# Patient Record
Sex: FEMALE | Race: WHITE | NOT HISPANIC OR LATINO | URBAN - METROPOLITAN AREA
[De-identification: names, ages, dates, MRNs, and addresses within clinical notes are randomized per-mention and may not be internally consistent; named-entity substitution may affect disease eponyms.]

---

## 2024-05-08 ENCOUNTER — TELEPHONE ENCOUNTER (OUTPATIENT)
Dept: URBAN - METROPOLITAN AREA CLINIC 63 | Facility: CLINIC | Age: 79
End: 2024-05-08

## 2024-05-13 ENCOUNTER — OFFICE VISIT (OUTPATIENT)
Dept: URBAN - METROPOLITAN AREA CLINIC 60 | Facility: CLINIC | Age: 79
End: 2024-05-13
Payer: MEDICARE

## 2024-05-13 ENCOUNTER — DASHBOARD ENCOUNTERS (OUTPATIENT)
Age: 79
End: 2024-05-13

## 2024-05-13 VITALS
OXYGEN SATURATION: 96 % | TEMPERATURE: 97.8 F | HEIGHT: 60 IN | SYSTOLIC BLOOD PRESSURE: 116 MMHG | WEIGHT: 132 LBS | DIASTOLIC BLOOD PRESSURE: 68 MMHG | BODY MASS INDEX: 25.91 KG/M2 | HEART RATE: 59 BPM

## 2024-05-13 DIAGNOSIS — Z87.19 HISTORY OF DIVERTICULITIS: ICD-10-CM

## 2024-05-13 DIAGNOSIS — Z12.11 COLON CANCER SCREENING: ICD-10-CM

## 2024-05-13 DIAGNOSIS — R43.8 METALLIC TASTE: ICD-10-CM

## 2024-05-13 PROBLEM — 11193009: Status: ACTIVE | Noted: 2024-05-13

## 2024-05-13 PROCEDURE — 99204 OFFICE O/P NEW MOD 45 MIN: CPT

## 2024-05-13 RX ORDER — CIPROFLOXACIN HYDROCHLORIDE 500 MG/1
TABLET, FILM COATED ORAL
Qty: 20 TABLET | Status: ACTIVE | COMMUNITY

## 2024-05-13 RX ORDER — ONDANSETRON HYDROCHLORIDE 4 MG/1
1 TABLET TABLET, FILM COATED ORAL
Qty: 2 | Refills: 0 | OUTPATIENT
Start: 2024-05-13

## 2024-05-13 RX ORDER — METOPROLOL TARTRATE 25 MG/1
TABLET, FILM COATED ORAL
Qty: 45 TABLET | Status: ACTIVE | COMMUNITY

## 2024-05-13 RX ORDER — APIXABAN 5 MG/1
TABLET, FILM COATED ORAL
Qty: 120 EACH | Refills: 0 | Status: ACTIVE | COMMUNITY

## 2024-05-13 RX ORDER — OMEGA-3 FATTY ACIDS/FISH OIL 300-1000MG
1 CAPSULE CAPSULE ORAL THREE TIMES A DAY
Qty: 90 | Status: ACTIVE | COMMUNITY
Start: 2024-05-13 | End: 2024-06-12

## 2024-05-13 RX ORDER — ALENDRONATE SODIUM 70 MG/1
TABLET ORAL
Qty: 12 EACH | Refills: 1 | Status: ACTIVE | COMMUNITY

## 2024-05-13 RX ORDER — EZETIMIBE 10 MG/1
TABLET ORAL
Qty: 90 TABLET | Status: ACTIVE | COMMUNITY

## 2024-05-13 RX ORDER — CHOLECALCIFEROL (VITAMIN D3) 50 MCG
1 CAPSULE CAPSULE ORAL ONCE A DAY
Qty: 30 | Status: ACTIVE | COMMUNITY
Start: 2024-05-13 | End: 2024-06-12

## 2024-05-13 RX ORDER — METRONIDAZOLE 250 MG/1
TABLET, FILM COATED ORAL
Qty: 30 TABLET | Status: ACTIVE | COMMUNITY

## 2024-05-20 ENCOUNTER — LAB OUTSIDE AN ENCOUNTER (OUTPATIENT)
Dept: URBAN - METROPOLITAN AREA CLINIC 60 | Facility: CLINIC | Age: 79
End: 2024-05-20

## 2024-07-13 ENCOUNTER — APPOINTMENT (OUTPATIENT)
Dept: CARDIOLOGY | Facility: HOSPITAL | Age: 79
End: 2024-07-13
Payer: MEDICARE

## 2024-07-13 ENCOUNTER — HOSPITAL ENCOUNTER (EMERGENCY)
Facility: HOSPITAL | Age: 79
Discharge: ANOTHER HOSPITAL | End: 2024-07-14
Attending: STUDENT IN AN ORGANIZED HEALTH CARE EDUCATION/TRAINING PROGRAM
Payer: MEDICARE

## 2024-07-13 ENCOUNTER — APPOINTMENT (OUTPATIENT)
Dept: RADIOLOGY | Facility: HOSPITAL | Age: 79
End: 2024-07-13
Payer: MEDICARE

## 2024-07-13 DIAGNOSIS — K92.2 UPPER GI BLEED: Primary | ICD-10-CM

## 2024-07-13 LAB
ALBUMIN SERPL BCP-MCNC: 4 G/DL (ref 3.4–5)
ALP SERPL-CCNC: 40 U/L (ref 33–136)
ALT SERPL W P-5'-P-CCNC: 7 U/L (ref 7–45)
ANION GAP SERPL CALC-SCNC: 15 MMOL/L (ref 10–20)
APTT PPP: 35 SECONDS (ref 27–38)
AST SERPL W P-5'-P-CCNC: 12 U/L (ref 9–39)
BASOPHILS # BLD AUTO: 0.08 X10*3/UL (ref 0–0.1)
BASOPHILS NFR BLD AUTO: 0.7 %
BILIRUB SERPL-MCNC: 0.6 MG/DL (ref 0–1.2)
BNP SERPL-MCNC: 83 PG/ML (ref 0–99)
BUN SERPL-MCNC: 56 MG/DL (ref 6–23)
CALCIUM SERPL-MCNC: 9.3 MG/DL (ref 8.6–10.3)
CARDIAC TROPONIN I PNL SERPL HS: 5 NG/L (ref 0–13)
CHLORIDE SERPL-SCNC: 108 MMOL/L (ref 98–107)
CO2 SERPL-SCNC: 21 MMOL/L (ref 21–32)
CREAT SERPL-MCNC: 0.79 MG/DL (ref 0.5–1.05)
EGFRCR SERPLBLD CKD-EPI 2021: 77 ML/MIN/1.73M*2
EOSINOPHIL # BLD AUTO: 0.01 X10*3/UL (ref 0–0.4)
EOSINOPHIL NFR BLD AUTO: 0.1 %
ERYTHROCYTE [DISTWIDTH] IN BLOOD BY AUTOMATED COUNT: 12.9 % (ref 11.5–14.5)
GLUCOSE SERPL-MCNC: 180 MG/DL (ref 74–99)
HCT VFR BLD AUTO: 32.8 % (ref 36–46)
HGB BLD-MCNC: 11.2 G/DL (ref 12–16)
IMM GRANULOCYTES # BLD AUTO: 0.06 X10*3/UL (ref 0–0.5)
IMM GRANULOCYTES NFR BLD AUTO: 0.5 % (ref 0–0.9)
INR PPP: 1.3 (ref 0.9–1.1)
LIPASE SERPL-CCNC: 21 U/L (ref 9–82)
LYMPHOCYTES # BLD AUTO: 1.32 X10*3/UL (ref 0.8–3)
LYMPHOCYTES NFR BLD AUTO: 11.4 %
MAGNESIUM SERPL-MCNC: 1.7 MG/DL (ref 1.6–2.4)
MCH RBC QN AUTO: 32.7 PG (ref 26–34)
MCHC RBC AUTO-ENTMCNC: 34.1 G/DL (ref 32–36)
MCV RBC AUTO: 96 FL (ref 80–100)
MONOCYTES # BLD AUTO: 0.43 X10*3/UL (ref 0.05–0.8)
MONOCYTES NFR BLD AUTO: 3.7 %
NEUTROPHILS # BLD AUTO: 9.63 X10*3/UL (ref 1.6–5.5)
NEUTROPHILS NFR BLD AUTO: 83.6 %
NRBC BLD-RTO: 0 /100 WBCS (ref 0–0)
PLATELET # BLD AUTO: 260 X10*3/UL (ref 150–450)
POTASSIUM SERPL-SCNC: 4.1 MMOL/L (ref 3.5–5.3)
PROT SERPL-MCNC: 6.1 G/DL (ref 6.4–8.2)
PROTHROMBIN TIME: 14.5 SECONDS (ref 9.8–12.8)
RBC # BLD AUTO: 3.43 X10*6/UL (ref 4–5.2)
SODIUM SERPL-SCNC: 140 MMOL/L (ref 136–145)
WBC # BLD AUTO: 11.5 X10*3/UL (ref 4.4–11.3)

## 2024-07-13 PROCEDURE — 86920 COMPATIBILITY TEST SPIN: CPT

## 2024-07-13 PROCEDURE — 96374 THER/PROPH/DIAG INJ IV PUSH: CPT | Mod: 59

## 2024-07-13 PROCEDURE — 85610 PROTHROMBIN TIME: CPT | Performed by: STUDENT IN AN ORGANIZED HEALTH CARE EDUCATION/TRAINING PROGRAM

## 2024-07-13 PROCEDURE — 84484 ASSAY OF TROPONIN QUANT: CPT | Performed by: STUDENT IN AN ORGANIZED HEALTH CARE EDUCATION/TRAINING PROGRAM

## 2024-07-13 PROCEDURE — 36415 COLL VENOUS BLD VENIPUNCTURE: CPT | Performed by: STUDENT IN AN ORGANIZED HEALTH CARE EDUCATION/TRAINING PROGRAM

## 2024-07-13 PROCEDURE — 85730 THROMBOPLASTIN TIME PARTIAL: CPT | Performed by: STUDENT IN AN ORGANIZED HEALTH CARE EDUCATION/TRAINING PROGRAM

## 2024-07-13 PROCEDURE — 86850 RBC ANTIBODY SCREEN: CPT | Performed by: STUDENT IN AN ORGANIZED HEALTH CARE EDUCATION/TRAINING PROGRAM

## 2024-07-13 PROCEDURE — 83880 ASSAY OF NATRIURETIC PEPTIDE: CPT | Performed by: STUDENT IN AN ORGANIZED HEALTH CARE EDUCATION/TRAINING PROGRAM

## 2024-07-13 PROCEDURE — 93005 ELECTROCARDIOGRAM TRACING: CPT

## 2024-07-13 PROCEDURE — 83690 ASSAY OF LIPASE: CPT | Performed by: STUDENT IN AN ORGANIZED HEALTH CARE EDUCATION/TRAINING PROGRAM

## 2024-07-13 PROCEDURE — 80053 COMPREHEN METABOLIC PANEL: CPT | Performed by: STUDENT IN AN ORGANIZED HEALTH CARE EDUCATION/TRAINING PROGRAM

## 2024-07-13 PROCEDURE — 74174 CTA ABD&PLVS W/CONTRAST: CPT

## 2024-07-13 PROCEDURE — 99285 EMERGENCY DEPT VISIT HI MDM: CPT | Mod: 25

## 2024-07-13 PROCEDURE — 85025 COMPLETE CBC W/AUTO DIFF WBC: CPT | Performed by: STUDENT IN AN ORGANIZED HEALTH CARE EDUCATION/TRAINING PROGRAM

## 2024-07-13 PROCEDURE — 83735 ASSAY OF MAGNESIUM: CPT | Performed by: STUDENT IN AN ORGANIZED HEALTH CARE EDUCATION/TRAINING PROGRAM

## 2024-07-13 PROCEDURE — 2500000004 HC RX 250 GENERAL PHARMACY W/ HCPCS (ALT 636 FOR OP/ED): Performed by: STUDENT IN AN ORGANIZED HEALTH CARE EDUCATION/TRAINING PROGRAM

## 2024-07-13 PROCEDURE — C9113 INJ PANTOPRAZOLE SODIUM, VIA: HCPCS | Performed by: STUDENT IN AN ORGANIZED HEALTH CARE EDUCATION/TRAINING PROGRAM

## 2024-07-13 RX ORDER — PANTOPRAZOLE SODIUM 40 MG/10ML
80 INJECTION, POWDER, LYOPHILIZED, FOR SOLUTION INTRAVENOUS ONCE
Status: COMPLETED | OUTPATIENT
Start: 2024-07-13 | End: 2024-07-13

## 2024-07-13 ASSESSMENT — COLUMBIA-SUICIDE SEVERITY RATING SCALE - C-SSRS
6. HAVE YOU EVER DONE ANYTHING, STARTED TO DO ANYTHING, OR PREPARED TO DO ANYTHING TO END YOUR LIFE?: NO
1. IN THE PAST MONTH, HAVE YOU WISHED YOU WERE DEAD OR WISHED YOU COULD GO TO SLEEP AND NOT WAKE UP?: NO
2. HAVE YOU ACTUALLY HAD ANY THOUGHTS OF KILLING YOURSELF?: NO

## 2024-07-13 ASSESSMENT — PAIN - FUNCTIONAL ASSESSMENT: PAIN_FUNCTIONAL_ASSESSMENT: 0-10

## 2024-07-13 ASSESSMENT — PAIN DESCRIPTION - FREQUENCY: FREQUENCY: INTERMITTENT

## 2024-07-13 ASSESSMENT — PAIN DESCRIPTION - PAIN TYPE: TYPE: ACUTE PAIN

## 2024-07-13 ASSESSMENT — LIFESTYLE VARIABLES
HAVE YOU EVER FELT YOU SHOULD CUT DOWN ON YOUR DRINKING: NO
EVER HAD A DRINK FIRST THING IN THE MORNING TO STEADY YOUR NERVES TO GET RID OF A HANGOVER: NO
HAVE PEOPLE ANNOYED YOU BY CRITICIZING YOUR DRINKING: NO
EVER FELT BAD OR GUILTY ABOUT YOUR DRINKING: NO
TOTAL SCORE: 0

## 2024-07-13 ASSESSMENT — PAIN DESCRIPTION - LOCATION: LOCATION: ABDOMEN

## 2024-07-13 ASSESSMENT — PAIN SCALES - GENERAL: PAINLEVEL_OUTOF10: 3

## 2024-07-14 ENCOUNTER — HOSPITAL ENCOUNTER (INPATIENT)
Facility: HOSPITAL | Age: 79
DRG: 378 | End: 2024-07-14
Attending: INTERNAL MEDICINE | Admitting: HOSPITALIST
Payer: MEDICARE

## 2024-07-14 VITALS
OXYGEN SATURATION: 96 % | HEART RATE: 100 BPM | BODY MASS INDEX: 25.32 KG/M2 | TEMPERATURE: 97.8 F | SYSTOLIC BLOOD PRESSURE: 117 MMHG | WEIGHT: 129 LBS | HEIGHT: 60 IN | DIASTOLIC BLOOD PRESSURE: 61 MMHG | RESPIRATION RATE: 18 BRPM

## 2024-07-14 VITALS
TEMPERATURE: 98 F | HEIGHT: 60 IN | HEART RATE: 87 BPM | DIASTOLIC BLOOD PRESSURE: 67 MMHG | RESPIRATION RATE: 18 BRPM | SYSTOLIC BLOOD PRESSURE: 124 MMHG | BODY MASS INDEX: 25.32 KG/M2 | OXYGEN SATURATION: 98 % | WEIGHT: 128.97 LBS

## 2024-07-14 DIAGNOSIS — K92.2 GI BLEED: ICD-10-CM

## 2024-07-14 DIAGNOSIS — D62 ANEMIA DUE TO ACUTE BLOOD LOSS: Primary | ICD-10-CM

## 2024-07-14 DIAGNOSIS — K92.1 MELENA: ICD-10-CM

## 2024-07-14 PROBLEM — R41.3 MEMORY DIFFICULTY: Status: ACTIVE | Noted: 2022-12-05

## 2024-07-14 PROBLEM — I10 PRIMARY HYPERTENSION: Status: ACTIVE | Noted: 2019-11-12

## 2024-07-14 PROBLEM — R73.01 IMPAIRED FASTING GLUCOSE: Status: ACTIVE | Noted: 2021-02-02

## 2024-07-14 PROBLEM — Z86.73 HISTORY OF CEREBROVASCULAR ACCIDENT: Status: ACTIVE | Noted: 2019-11-12

## 2024-07-14 PROBLEM — I25.10 CORONARY ARTERY DISEASE INVOLVING NATIVE CORONARY ARTERY OF NATIVE HEART WITHOUT ANGINA PECTORIS: Status: ACTIVE | Noted: 2017-09-05

## 2024-07-14 PROBLEM — I21.4 NSTEMI (NON-ST ELEVATED MYOCARDIAL INFARCTION) (MULTI): Status: ACTIVE | Noted: 2017-04-09

## 2024-07-14 PROBLEM — K92.0 GASTROINTESTINAL HEMORRHAGE WITH HEMATEMESIS: Status: ACTIVE | Noted: 2024-07-14

## 2024-07-14 PROBLEM — M79.10 MYALGIA DUE TO HMG COA REDUCTASE INHIBITOR: Status: ACTIVE | Noted: 2024-03-14

## 2024-07-14 PROBLEM — E78.2 MIXED HYPERLIPIDEMIA: Status: ACTIVE | Noted: 2019-11-12

## 2024-07-14 PROBLEM — I25.119 ANGINA CONCURRENT WITH AND DUE TO ARTERIOSCLEROSIS OF CORONARY ARTERY (CMS-HCC): Status: ACTIVE | Noted: 2024-03-14

## 2024-07-14 PROBLEM — Q21.12 PATENT FORAMEN OVALE (HHS-HCC): Status: ACTIVE | Noted: 2024-07-14

## 2024-07-14 PROBLEM — M81.0 OSTEOPOROSIS: Status: ACTIVE | Noted: 2020-06-09

## 2024-07-14 PROBLEM — H81.10 BENIGN PAROXYSMAL POSITIONAL VERTIGO: Status: ACTIVE | Noted: 2024-07-14

## 2024-07-14 PROBLEM — T46.6X5A MYALGIA DUE TO HMG COA REDUCTASE INHIBITOR: Status: ACTIVE | Noted: 2024-03-14

## 2024-07-14 LAB
ABO GROUP (TYPE) IN BLOOD: NORMAL
ANTIBODY SCREEN: NORMAL
ANTIBODY SCREEN: NORMAL
BLOOD EXPIRATION DATE: NORMAL
BLOOD EXPIRATION DATE: NORMAL
DISPENSE STATUS: NORMAL
DISPENSE STATUS: NORMAL
HCT VFR BLD AUTO: 28.1 % (ref 36–46)
HCT VFR BLD AUTO: 28.2 % (ref 36–46)
HCT VFR BLD AUTO: 31.6 % (ref 36–46)
HGB BLD-MCNC: 10.7 G/DL (ref 12–16)
HGB BLD-MCNC: 9.1 G/DL (ref 12–16)
HGB BLD-MCNC: 9.5 G/DL (ref 12–16)
PRODUCT BLOOD TYPE: 6200
PRODUCT BLOOD TYPE: 6200
PRODUCT CODE: NORMAL
PRODUCT CODE: NORMAL
RH FACTOR (ANTIGEN D): NORMAL
UNIT ABO: NORMAL
UNIT ABO: NORMAL
UNIT NUMBER: NORMAL
UNIT NUMBER: NORMAL
UNIT RH: NORMAL
UNIT RH: NORMAL
UNIT VOLUME: 288
UNIT VOLUME: 290
XM INTEP: NORMAL
XM INTEP: NORMAL

## 2024-07-14 PROCEDURE — 36415 COLL VENOUS BLD VENIPUNCTURE: CPT | Performed by: STUDENT IN AN ORGANIZED HEALTH CARE EDUCATION/TRAINING PROGRAM

## 2024-07-14 PROCEDURE — 2500000001 HC RX 250 WO HCPCS SELF ADMINISTERED DRUGS (ALT 637 FOR MEDICARE OP): Performed by: HOSPITALIST

## 2024-07-14 PROCEDURE — 2500000002 HC RX 250 W HCPCS SELF ADMINISTERED DRUGS (ALT 637 FOR MEDICARE OP, ALT 636 FOR OP/ED): Performed by: HOSPITALIST

## 2024-07-14 PROCEDURE — 74174 CTA ABD&PLVS W/CONTRAST: CPT | Performed by: RADIOLOGY

## 2024-07-14 PROCEDURE — 86850 RBC ANTIBODY SCREEN: CPT | Performed by: HOSPITALIST

## 2024-07-14 PROCEDURE — 2550000001 HC RX 255 CONTRASTS: Performed by: STUDENT IN AN ORGANIZED HEALTH CARE EDUCATION/TRAINING PROGRAM

## 2024-07-14 PROCEDURE — 85018 HEMOGLOBIN: CPT | Performed by: HOSPITALIST

## 2024-07-14 PROCEDURE — 99222 1ST HOSP IP/OBS MODERATE 55: CPT | Performed by: HOSPITALIST

## 2024-07-14 PROCEDURE — C9113 INJ PANTOPRAZOLE SODIUM, VIA: HCPCS | Performed by: HOSPITALIST

## 2024-07-14 PROCEDURE — 36415 COLL VENOUS BLD VENIPUNCTURE: CPT | Performed by: EMERGENCY MEDICINE

## 2024-07-14 PROCEDURE — 1100000001 HC PRIVATE ROOM DAILY

## 2024-07-14 PROCEDURE — 85014 HEMATOCRIT: CPT | Performed by: EMERGENCY MEDICINE

## 2024-07-14 PROCEDURE — 99221 1ST HOSP IP/OBS SF/LOW 40: CPT | Performed by: INTERNAL MEDICINE

## 2024-07-14 PROCEDURE — 2500000004 HC RX 250 GENERAL PHARMACY W/ HCPCS (ALT 636 FOR OP/ED): Performed by: HOSPITALIST

## 2024-07-14 RX ORDER — PANTOPRAZOLE SODIUM 40 MG/10ML
40 INJECTION, POWDER, LYOPHILIZED, FOR SOLUTION INTRAVENOUS EVERY 12 HOURS
Status: DISCONTINUED | OUTPATIENT
Start: 2024-07-14 | End: 2024-07-16 | Stop reason: HOSPADM

## 2024-07-14 RX ORDER — EZETIMIBE 10 MG/1
10 TABLET ORAL DAILY
Status: DISCONTINUED | OUTPATIENT
Start: 2024-07-14 | End: 2024-07-16 | Stop reason: HOSPADM

## 2024-07-14 RX ORDER — SODIUM CHLORIDE, SODIUM LACTATE, POTASSIUM CHLORIDE, CALCIUM CHLORIDE 600; 310; 30; 20 MG/100ML; MG/100ML; MG/100ML; MG/100ML
50 INJECTION, SOLUTION INTRAVENOUS CONTINUOUS
Status: DISCONTINUED | OUTPATIENT
Start: 2024-07-14 | End: 2024-07-15

## 2024-07-14 RX ORDER — CHOLECALCIFEROL (VITAMIN D3) 25 MCG
2000 TABLET ORAL DAILY
Status: DISCONTINUED | OUTPATIENT
Start: 2024-07-14 | End: 2024-07-16 | Stop reason: HOSPADM

## 2024-07-14 RX ORDER — METOPROLOL SUCCINATE 25 MG/1
12.5 TABLET, EXTENDED RELEASE ORAL DAILY
COMMUNITY

## 2024-07-14 RX ORDER — IBUPROFEN 100 MG/5ML
1000 SUSPENSION, ORAL (FINAL DOSE FORM) ORAL DAILY
COMMUNITY

## 2024-07-14 RX ORDER — LANOLIN ALCOHOL/MO/W.PET/CERES
2500 CREAM (GRAM) TOPICAL DAILY
COMMUNITY

## 2024-07-14 RX ORDER — EZETIMIBE 10 MG/1
10 TABLET ORAL DAILY
COMMUNITY

## 2024-07-14 RX ORDER — OMEGA-3-ACID ETHYL ESTERS 1 G/1
1 CAPSULE, LIQUID FILLED ORAL 2 TIMES DAILY
COMMUNITY

## 2024-07-14 RX ORDER — CALCIUM CARBONATE 500(1250)
1 TABLET ORAL DAILY
COMMUNITY

## 2024-07-14 RX ORDER — ALENDRONATE SODIUM 10 MG/1
10 TABLET ORAL
COMMUNITY

## 2024-07-14 RX ADMIN — PANTOPRAZOLE SODIUM 40 MG: 40 INJECTION, POWDER, FOR SOLUTION INTRAVENOUS at 21:34

## 2024-07-14 RX ADMIN — SODIUM CHLORIDE, POTASSIUM CHLORIDE, SODIUM LACTATE AND CALCIUM CHLORIDE 50 ML/HR: 600; 310; 30; 20 INJECTION, SOLUTION INTRAVENOUS at 12:46

## 2024-07-14 RX ADMIN — EZETIMIBE 10 MG: 10 TABLET ORAL at 12:45

## 2024-07-14 RX ADMIN — CYANOCOBALAMIN TAB 500 MCG 2500 MCG: 500 TAB at 12:45

## 2024-07-14 RX ADMIN — Medication 2000 UNITS: at 12:46

## 2024-07-14 RX ADMIN — PANTOPRAZOLE SODIUM 40 MG: 40 INJECTION, POWDER, FOR SOLUTION INTRAVENOUS at 12:46

## 2024-07-14 SDOH — SOCIAL STABILITY: SOCIAL INSECURITY: DO YOU FEEL UNSAFE GOING BACK TO THE PLACE WHERE YOU ARE LIVING?: NO

## 2024-07-14 SDOH — SOCIAL STABILITY: SOCIAL INSECURITY: ARE YOU OR HAVE YOU BEEN THREATENED OR ABUSED PHYSICALLY, EMOTIONALLY, OR SEXUALLY BY ANYONE?: NO

## 2024-07-14 SDOH — SOCIAL STABILITY: SOCIAL INSECURITY: ABUSE: ADULT

## 2024-07-14 SDOH — SOCIAL STABILITY: SOCIAL INSECURITY: HAVE YOU HAD THOUGHTS OF HARMING ANYONE ELSE?: NO

## 2024-07-14 SDOH — SOCIAL STABILITY: SOCIAL INSECURITY: HAS ANYONE EVER THREATENED TO HURT YOUR FAMILY OR YOUR PETS?: NO

## 2024-07-14 SDOH — SOCIAL STABILITY: SOCIAL INSECURITY: DOES ANYONE TRY TO KEEP YOU FROM HAVING/CONTACTING OTHER FRIENDS OR DOING THINGS OUTSIDE YOUR HOME?: NO

## 2024-07-14 SDOH — SOCIAL STABILITY: SOCIAL INSECURITY: WERE YOU ABLE TO COMPLETE ALL THE BEHAVIORAL HEALTH SCREENINGS?: YES

## 2024-07-14 SDOH — SOCIAL STABILITY: SOCIAL INSECURITY: ARE THERE ANY APPARENT SIGNS OF INJURIES/BEHAVIORS THAT COULD BE RELATED TO ABUSE/NEGLECT?: NO

## 2024-07-14 SDOH — SOCIAL STABILITY: SOCIAL INSECURITY: DO YOU FEEL ANYONE HAS EXPLOITED OR TAKEN ADVANTAGE OF YOU FINANCIALLY OR OF YOUR PERSONAL PROPERTY?: NO

## 2024-07-14 SDOH — SOCIAL STABILITY: SOCIAL INSECURITY: HAVE YOU HAD ANY THOUGHTS OF HARMING ANYONE ELSE?: NO

## 2024-07-14 ASSESSMENT — COGNITIVE AND FUNCTIONAL STATUS - GENERAL
DRESSING REGULAR UPPER BODY CLOTHING: A LITTLE
CLIMB 3 TO 5 STEPS WITH RAILING: A LITTLE
TOILETING: A LITTLE
DAILY ACTIVITIY SCORE: 20
DRESSING REGULAR LOWER BODY CLOTHING: A LITTLE
DRESSING REGULAR LOWER BODY CLOTHING: A LITTLE
STANDING UP FROM CHAIR USING ARMS: A LITTLE
DRESSING REGULAR UPPER BODY CLOTHING: A LITTLE
HELP NEEDED FOR BATHING: A LITTLE
TOILETING: A LITTLE
HELP NEEDED FOR BATHING: A LITTLE
STANDING UP FROM CHAIR USING ARMS: A LITTLE
MOBILITY SCORE: 18
MOVING TO AND FROM BED TO CHAIR: A LITTLE
CLIMB 3 TO 5 STEPS WITH RAILING: A LITTLE
MOVING FROM LYING ON BACK TO SITTING ON SIDE OF FLAT BED WITH BEDRAILS: A LITTLE
DAILY ACTIVITIY SCORE: 20
TURNING FROM BACK TO SIDE WHILE IN FLAT BAD: A LITTLE
MOBILITY SCORE: 18
MOVING TO AND FROM BED TO CHAIR: A LITTLE
MOVING FROM LYING ON BACK TO SITTING ON SIDE OF FLAT BED WITH BEDRAILS: A LITTLE
WALKING IN HOSPITAL ROOM: A LITTLE
PATIENT BASELINE BEDBOUND: NO
TURNING FROM BACK TO SIDE WHILE IN FLAT BAD: A LITTLE
WALKING IN HOSPITAL ROOM: A LITTLE

## 2024-07-14 ASSESSMENT — PATIENT HEALTH QUESTIONNAIRE - PHQ9
1. LITTLE INTEREST OR PLEASURE IN DOING THINGS: NOT AT ALL
2. FEELING DOWN, DEPRESSED OR HOPELESS: NOT AT ALL
SUM OF ALL RESPONSES TO PHQ9 QUESTIONS 1 & 2: 0

## 2024-07-14 ASSESSMENT — ACTIVITIES OF DAILY LIVING (ADL)
HEARING - RIGHT EAR: FUNCTIONAL
HEARING - LEFT EAR: FUNCTIONAL
BATHING: INDEPENDENT
WALKS IN HOME: INDEPENDENT
LACK_OF_TRANSPORTATION: NO
TOILETING: INDEPENDENT
ADEQUATE_TO_COMPLETE_ADL: YES
PATIENT'S MEMORY ADEQUATE TO SAFELY COMPLETE DAILY ACTIVITIES?: YES
JUDGMENT_ADEQUATE_SAFELY_COMPLETE_DAILY_ACTIVITIES: YES
DRESSING YOURSELF: INDEPENDENT
GROOMING: INDEPENDENT
FEEDING YOURSELF: INDEPENDENT

## 2024-07-14 ASSESSMENT — PAIN - FUNCTIONAL ASSESSMENT: PAIN_FUNCTIONAL_ASSESSMENT: 0-10

## 2024-07-14 ASSESSMENT — LIFESTYLE VARIABLES
SKIP TO QUESTIONS 9-10: 1
AUDIT-C TOTAL SCORE: 0
HOW OFTEN DO YOU HAVE 6 OR MORE DRINKS ON ONE OCCASION: NEVER
HOW MANY STANDARD DRINKS CONTAINING ALCOHOL DO YOU HAVE ON A TYPICAL DAY: PATIENT DOES NOT DRINK
HOW OFTEN DO YOU HAVE A DRINK CONTAINING ALCOHOL: NEVER
AUDIT-C TOTAL SCORE: 0

## 2024-07-14 ASSESSMENT — PAIN SCALES - GENERAL: PAINLEVEL_OUTOF10: 0 - NO PAIN

## 2024-07-14 NOTE — PROGRESS NOTES
Emergency Medicine Transition of Care Note.    I received Eryn Hoyt in signout from Dr. Cornelius.  Please see the previous ED provider note for all HPI, PE and MDM up to the time of signout. This is in addition to the primary record.    In brief Eryn Hoyt is an 79 y.o. female presenting for   Chief Complaint   Patient presents with    Rectal Bleeding     Pt c/o rectal bleeding x 1 day.  Pt states she has had 2 episodes of black tarry stools. Pt has a hx of diverticulitis and is on eliquis.     At the time of signout we were awaiting: CTA and admission    ED Course as of 07/14/24 0629   Sat Jul 13, 2024   2336 EKG on my read shows sinus tachycardia rate 123 bpm no ST elevations mild ST depressions in leads V4 through V6 but there is a lot of wander.  Otherwise normal intervals. [CF]      ED Course User Index  [CF] Codie Cornelius MD         Diagnoses as of 07/14/24 0629   Upper GI bleed       Medical Decision Making  Patient with outpatient labs showing hemoglobin in the upper 13's.  Initial hemoglobin here is 11.2.  2-hour repeat is downtrending to 9.5.  CT scan does not show evidence of active bleed.  Patient was consented for blood transfusion if needed.    I contacted Dr. Bean, on-call with GI.  He states that we will be unable to perform endoscopy during the day today and there is no one on-call with GI after this.  As such he recommends transfer to a facility with GI coverage.    I then spoke with , hospitalist at [facility].  He accepted the patient in transfer.  The patient will be monitored in the ER until transfer can be arranged.    Final diagnoses:   [K92.2] Upper GI bleed           Procedure  Procedures    Naz Atwood DO

## 2024-07-14 NOTE — ED PROVIDER NOTES
HPI   Chief Complaint   Patient presents with    Rectal Bleeding     Pt c/o rectal bleeding x 1 day.  Pt states she has had 2 episodes of black tarry stools. Pt has a hx of diverticulitis and is on eliquis.       This is a 79-year-old female who is on Eliquis for prior strokes presents emergency department for nausea, vomiting and hematemesis and melena that started today.  She states has been feeling weak and tired.  Has never had a GI bleed before.  She is intermittently having abdominal pain she denies any other issues or concerns otherwise                        Isidro Coma Scale Score: 15                  Patient History   History reviewed. No pertinent past medical history.  History reviewed. No pertinent surgical history.  No family history on file.  Social History     Tobacco Use    Smoking status: Never    Smokeless tobacco: Never   Vaping Use    Vaping status: Never Used   Substance Use Topics    Alcohol use: Not on file    Drug use: Never       Physical Exam   ED Triage Vitals [07/13/24 2238]   Temperature Heart Rate Respirations BP   36.6 °C (97.8 °F) (!) 122 18 118/75      Pulse Ox Temp Source Heart Rate Source Patient Position   100 % Temporal Monitor --      BP Location FiO2 (%)     Left arm --       Physical Exam  Constitutional:       General: She is not in acute distress.  HENT:      Head: Normocephalic and atraumatic.      Right Ear: Tympanic membrane normal.      Left Ear: Tympanic membrane normal.      Mouth/Throat:      Mouth: Mucous membranes are moist.   Eyes:      Extraocular Movements: Extraocular movements intact.      Conjunctiva/sclera: Conjunctivae normal.      Pupils: Pupils are equal, round, and reactive to light.   Cardiovascular:      Rate and Rhythm: Normal rate and regular rhythm.      Heart sounds: No murmur heard.  Pulmonary:      Effort: Pulmonary effort is normal. No respiratory distress.      Breath sounds: Normal breath sounds. No stridor. No wheezing or rales.   Abdominal:       General: Bowel sounds are normal. There is no distension.      Tenderness: There is no abdominal tenderness. There is no guarding or rebound.   Genitourinary:     Comments: Melena seen on rectal exam  Musculoskeletal:         General: No swelling, tenderness or deformity. Normal range of motion.   Skin:     General: Skin is warm and dry.      Coloration: Skin is not jaundiced.      Findings: No bruising or lesion.   Neurological:      General: No focal deficit present.      Mental Status: She is alert and oriented to person, place, and time. Mental status is at baseline.      Cranial Nerves: No cranial nerve deficit.      Motor: No weakness.   Psychiatric:         Mood and Affect: Mood normal.     Labs Reviewed - No data to display  No orders to display       ED Course & MDM   ED Course as of 07/14/24 2113   Sat Jul 13, 2024 2336 EKG on my read shows sinus tachycardia rate 123 bpm no ST elevations mild ST depressions in leads V4 through V6 but there is a lot of wander.  Otherwise normal intervals. [CF]      ED Course User Index  [CF] Codie Cornelius MD         Diagnoses as of 07/14/24 2113   Upper GI bleed       Medical Decision Making  This is a 79-year-old female who presents to the emergency department for concern of rectal bleeding and 1 episode of hematemesis that started today along with fatigue and weakness.  She is on Eliquis her last dose was this morning she did not take her evening dose.  On presentation she is tachycardic but her EKG is nonischemic.  She has no tender palpation of her abdomen.  She does have melena on her rectal exam.  She was given Protonix for concerns of upper GI bleed.  I did order CT angio to assess for any active bleeding.  Her CBC does appear to have gone down about 2 points.  Patient was signed out pending the rest of her labs and imaging to the oncoming provider but I do suspect that patient most likely has a upper GI bleed that is causing her fatigue but will assess  for any electrolyte derangements as well        Procedure  Procedures     Codie Cornelius MD  07/14/24 5338

## 2024-07-14 NOTE — CONSULTS
Inpatient consult to Gastroenterology  Consult performed by: Danilo Manzanares DO  Consult ordered by: Sergio Cross DO  Reason for consult: Anemia with melena  Assessment/Recommendations: Upper GI bleeding in patient with history of  due to NSAIDs complicated by Eliquis     Plan  Agree with medical therapy  EGD tomorrow           Reason For Consult  Melena with acute blood loss anemia    History Of Present Illness  Eryn Hoyt is a 79 y.o. female presenting with history of remote gastric ulcer due to NSAIDs treated and no further bleeding until recently traveling around country and happened to be here in [place] visiting family.  She endorses melena.  Hb 13 to 9.2.  No red blood.  Last colonoscopy 7 years ago.  No pain or other symptoms.  Family visiting today.     Past Medical History  She has no past medical history on file. - see updated problem list    Surgical History  She has no past surgical history on file.     Social History  She reports that she has never smoked. She has never used smokeless tobacco. She reports that she does not use drugs. No history on file for alcohol use.    Family History  No family history on file.     Allergies  Statins-hmg-coa reductase inhibitors    Review of Systems     Physical Exam  Constitutional:       General: She is not in acute distress.     Appearance: Normal appearance. She is normal weight. She is not ill-appearing.   Pulmonary:      Effort: Pulmonary effort is normal.   Abdominal:      Palpations: Abdomen is soft.   Neurological:      Mental Status: She is alert.          Last Recorded Vitals  Blood pressure 114/64, pulse 107, temperature 36.9 °C (98.5 °F), temperature source Oral, resp. rate 22, SpO2 99%.    Relevant Results  Scheduled medications  cholecalciferol, 2,000 Units, oral, Daily  cyanocobalamin, 2,500 mcg, oral, Daily  ezetimibe, 10 mg, oral, Daily  pantoprazole, 40 mg, intravenous, q12h      Continuous medications  lactated Ringer's, 50 mL/hr, Last Rate: 50  mL/hr (07/14/24 1246)      PRN medications    Results for orders placed or performed during the hospital encounter of 07/14/24 (from the past 24 hour(s))   Hemoglobin and hematocrit, blood   Result Value Ref Range    Hemoglobin 10.7 (L) 12.0 - 16.0 g/dL    Hematocrit 31.6 (L) 36.0 - 46.0 %            Assessment/Plan     Acute upper GI bleeding in patient with CVD and chronic anticoagulation   History of NSAID induced GI    Recommend NPO after midnight and EGD tomorrow    I spent 45 minutes in the professional and overall care of this patient.  Danilo Manzanares, DO

## 2024-07-14 NOTE — H&P (VIEW-ONLY)
Inpatient consult to Gastroenterology  Consult performed by: aDnilo Manzanares DO  Consult ordered by: Sergio Cross DO  Reason for consult: Anemia with melena  Assessment/Recommendations: Upper GI bleeding in patient with history of  due to NSAIDs complicated by Eliquis     Plan  Agree with medical therapy  EGD tomorrow           Reason For Consult  Melena with acute blood loss anemia    History Of Present Illness  Eryn Hoyt is a 79 y.o. female presenting with history of remote gastric ulcer due to NSAIDs treated and no further bleeding until recently traveling around country and happened to be here in [place] visiting family.  She endorses melena.  Hb 13 to 9.2.  No red blood.  Last colonoscopy 7 years ago.  No pain or other symptoms.  Family visiting today.     Past Medical History  She has no past medical history on file. - see updated problem list    Surgical History  She has no past surgical history on file.     Social History  She reports that she has never smoked. She has never used smokeless tobacco. She reports that she does not use drugs. No history on file for alcohol use.    Family History  No family history on file.     Allergies  Statins-hmg-coa reductase inhibitors    Review of Systems     Physical Exam  Constitutional:       General: She is not in acute distress.     Appearance: Normal appearance. She is normal weight. She is not ill-appearing.   Pulmonary:      Effort: Pulmonary effort is normal.   Abdominal:      Palpations: Abdomen is soft.   Neurological:      Mental Status: She is alert.          Last Recorded Vitals  Blood pressure 114/64, pulse 107, temperature 36.9 °C (98.5 °F), temperature source Oral, resp. rate 22, SpO2 99%.    Relevant Results  Scheduled medications  cholecalciferol, 2,000 Units, oral, Daily  cyanocobalamin, 2,500 mcg, oral, Daily  ezetimibe, 10 mg, oral, Daily  pantoprazole, 40 mg, intravenous, q12h      Continuous medications  lactated Ringer's, 50 mL/hr, Last Rate: 50  mL/hr (07/14/24 1246)      PRN medications    Results for orders placed or performed during the hospital encounter of 07/14/24 (from the past 24 hour(s))   Hemoglobin and hematocrit, blood   Result Value Ref Range    Hemoglobin 10.7 (L) 12.0 - 16.0 g/dL    Hematocrit 31.6 (L) 36.0 - 46.0 %            Assessment/Plan     Acute upper GI bleeding in patient with CVD and chronic anticoagulation   History of NSAID induced GI    Recommend NPO after midnight and EGD tomorrow    I spent 45 minutes in the professional and overall care of this patient.  Danilo Manzanares, DO

## 2024-07-15 ENCOUNTER — APPOINTMENT (OUTPATIENT)
Dept: GASTROENTEROLOGY | Facility: HOSPITAL | Age: 79
DRG: 378 | End: 2024-07-15
Payer: MEDICARE

## 2024-07-15 ENCOUNTER — ANESTHESIA EVENT (OUTPATIENT)
Dept: GASTROENTEROLOGY | Facility: HOSPITAL | Age: 79
DRG: 378 | End: 2024-07-15
Payer: MEDICARE

## 2024-07-15 ENCOUNTER — ANESTHESIA (OUTPATIENT)
Dept: GASTROENTEROLOGY | Facility: HOSPITAL | Age: 79
DRG: 378 | End: 2024-07-15
Payer: MEDICARE

## 2024-07-15 LAB
ATRIAL RATE: 123 BPM
ERYTHROCYTE [DISTWIDTH] IN BLOOD BY AUTOMATED COUNT: 13.2 % (ref 11.5–14.5)
HCT VFR BLD AUTO: 25.1 % (ref 36–46)
HCT VFR BLD AUTO: 27.7 % (ref 36–46)
HGB BLD-MCNC: 8.5 G/DL (ref 12–16)
HGB BLD-MCNC: 9.1 G/DL (ref 12–16)
MCH RBC QN AUTO: 32.4 PG (ref 26–34)
MCHC RBC AUTO-ENTMCNC: 32.9 G/DL (ref 32–36)
MCV RBC AUTO: 99 FL (ref 80–100)
NRBC BLD-RTO: 0 /100 WBCS (ref 0–0)
P AXIS: 53 DEGREES
PLATELET # BLD AUTO: 193 X10*3/UL (ref 150–450)
PR INTERVAL: 135 MS
Q ONSET: 252 MS
QRS COUNT: 20 BEATS
QRS DURATION: 87 MS
QT INTERVAL: 326 MS
QTC CALCULATION(BAZETT): 467 MS
QTC FREDERICIA: 414 MS
R AXIS: -11 DEGREES
RBC # BLD AUTO: 2.81 X10*6/UL (ref 4–5.2)
T AXIS: 68 DEGREES
T OFFSET: 415 MS
VENTRICULAR RATE: 123 BPM
WBC # BLD AUTO: 6.9 X10*3/UL (ref 4.4–11.3)

## 2024-07-15 PROCEDURE — A43235 PR ESOPHAGOGASTRODUODENOSCOPY TRANSORAL DIAGNOSTIC: Performed by: ANESTHESIOLOGY

## 2024-07-15 PROCEDURE — 2500000001 HC RX 250 WO HCPCS SELF ADMINISTERED DRUGS (ALT 637 FOR MEDICARE OP): Performed by: INTERNAL MEDICINE

## 2024-07-15 PROCEDURE — 3700000002 HC GENERAL ANESTHESIA TIME - EACH INCREMENTAL 1 MINUTE

## 2024-07-15 PROCEDURE — 36415 COLL VENOUS BLD VENIPUNCTURE: CPT | Performed by: HOSPITALIST

## 2024-07-15 PROCEDURE — 85014 HEMATOCRIT: CPT | Performed by: HOSPITALIST

## 2024-07-15 PROCEDURE — 2500000004 HC RX 250 GENERAL PHARMACY W/ HCPCS (ALT 636 FOR OP/ED): Performed by: NURSE ANESTHETIST, CERTIFIED REGISTERED

## 2024-07-15 PROCEDURE — 7100000010 HC PHASE TWO TIME - EACH INCREMENTAL 1 MINUTE

## 2024-07-15 PROCEDURE — 1100000001 HC PRIVATE ROOM DAILY

## 2024-07-15 PROCEDURE — 99100 ANES PT EXTEME AGE<1 YR&>70: CPT | Performed by: ANESTHESIOLOGY

## 2024-07-15 PROCEDURE — 99232 SBSQ HOSP IP/OBS MODERATE 35: CPT | Performed by: HOSPITALIST

## 2024-07-15 PROCEDURE — A43235 PR ESOPHAGOGASTRODUODENOSCOPY TRANSORAL DIAGNOSTIC: Performed by: NURSE ANESTHETIST, CERTIFIED REGISTERED

## 2024-07-15 PROCEDURE — 43235 EGD DIAGNOSTIC BRUSH WASH: CPT | Performed by: INTERNAL MEDICINE

## 2024-07-15 PROCEDURE — 2500000001 HC RX 250 WO HCPCS SELF ADMINISTERED DRUGS (ALT 637 FOR MEDICARE OP): Performed by: HOSPITALIST

## 2024-07-15 PROCEDURE — 43235 EGD DIAGNOSTIC BRUSH WASH: CPT

## 2024-07-15 PROCEDURE — 3700000001 HC GENERAL ANESTHESIA TIME - INITIAL BASE CHARGE

## 2024-07-15 PROCEDURE — 2500000004 HC RX 250 GENERAL PHARMACY W/ HCPCS (ALT 636 FOR OP/ED): Performed by: HOSPITALIST

## 2024-07-15 PROCEDURE — 0DJ08ZZ INSPECTION OF UPPER INTESTINAL TRACT, VIA NATURAL OR ARTIFICIAL OPENING ENDOSCOPIC: ICD-10-PCS | Performed by: INTERNAL MEDICINE

## 2024-07-15 PROCEDURE — 2500000002 HC RX 250 W HCPCS SELF ADMINISTERED DRUGS (ALT 637 FOR MEDICARE OP, ALT 636 FOR OP/ED): Performed by: HOSPITALIST

## 2024-07-15 PROCEDURE — 7100000009 HC PHASE TWO TIME - INITIAL BASE CHARGE

## 2024-07-15 PROCEDURE — 85027 COMPLETE CBC AUTOMATED: CPT | Performed by: HOSPITALIST

## 2024-07-15 PROCEDURE — C9113 INJ PANTOPRAZOLE SODIUM, VIA: HCPCS | Performed by: HOSPITALIST

## 2024-07-15 RX ORDER — SODIUM CHLORIDE, SODIUM LACTATE, POTASSIUM CHLORIDE, CALCIUM CHLORIDE 600; 310; 30; 20 MG/100ML; MG/100ML; MG/100ML; MG/100ML
20 INJECTION, SOLUTION INTRAVENOUS CONTINUOUS
Status: CANCELLED | OUTPATIENT
Start: 2024-07-15

## 2024-07-15 RX ORDER — PROPOFOL 10 MG/ML
INJECTION, EMULSION INTRAVENOUS AS NEEDED
Status: DISCONTINUED | OUTPATIENT
Start: 2024-07-15 | End: 2024-07-15

## 2024-07-15 RX ORDER — POLYETHYLENE GLYCOL 3350, SODIUM CHLORIDE, SODIUM BICARBONATE, POTASSIUM CHLORIDE 420; 11.2; 5.72; 1.48 G/4L; G/4L; G/4L; G/4L
4000 POWDER, FOR SOLUTION ORAL ONCE
Status: COMPLETED | OUTPATIENT
Start: 2024-07-15 | End: 2024-07-15

## 2024-07-15 RX ADMIN — Medication 2000 UNITS: at 11:24

## 2024-07-15 RX ADMIN — PANTOPRAZOLE SODIUM 40 MG: 40 INJECTION, POWDER, FOR SOLUTION INTRAVENOUS at 11:25

## 2024-07-15 RX ADMIN — POLYETHYLENE GLYCOL 3350, SODIUM SULFATE ANHYDROUS, SODIUM BICARBONATE, SODIUM CHLORIDE, POTASSIUM CHLORIDE 4000 ML: 236; 22.74; 6.74; 5.86; 2.97 POWDER, FOR SOLUTION ORAL at 17:02

## 2024-07-15 RX ADMIN — EZETIMIBE 10 MG: 10 TABLET ORAL at 11:25

## 2024-07-15 RX ADMIN — CYANOCOBALAMIN TAB 500 MCG 2500 MCG: 500 TAB at 11:24

## 2024-07-15 RX ADMIN — PANTOPRAZOLE SODIUM 40 MG: 40 INJECTION, POWDER, FOR SOLUTION INTRAVENOUS at 21:16

## 2024-07-15 ASSESSMENT — COGNITIVE AND FUNCTIONAL STATUS - GENERAL
MOVING TO AND FROM BED TO CHAIR: A LITTLE
TOILETING: A LITTLE
DRESSING REGULAR UPPER BODY CLOTHING: A LITTLE
TURNING FROM BACK TO SIDE WHILE IN FLAT BAD: A LITTLE
DRESSING REGULAR LOWER BODY CLOTHING: A LITTLE
CLIMB 3 TO 5 STEPS WITH RAILING: A LITTLE
MOBILITY SCORE: 18
DAILY ACTIVITIY SCORE: 20
WALKING IN HOSPITAL ROOM: A LITTLE
MOVING FROM LYING ON BACK TO SITTING ON SIDE OF FLAT BED WITH BEDRAILS: A LITTLE
WALKING IN HOSPITAL ROOM: A LITTLE
MOVING TO AND FROM BED TO CHAIR: A LITTLE
HELP NEEDED FOR BATHING: A LITTLE
STANDING UP FROM CHAIR USING ARMS: A LITTLE
TOILETING: A LITTLE
CLIMB 3 TO 5 STEPS WITH RAILING: A LITTLE
MOBILITY SCORE: 18
HELP NEEDED FOR BATHING: A LITTLE
TURNING FROM BACK TO SIDE WHILE IN FLAT BAD: A LITTLE
DAILY ACTIVITIY SCORE: 20
STANDING UP FROM CHAIR USING ARMS: A LITTLE
DRESSING REGULAR UPPER BODY CLOTHING: A LITTLE
DRESSING REGULAR LOWER BODY CLOTHING: A LITTLE
MOVING FROM LYING ON BACK TO SITTING ON SIDE OF FLAT BED WITH BEDRAILS: A LITTLE

## 2024-07-15 ASSESSMENT — PAIN - FUNCTIONAL ASSESSMENT
PAIN_FUNCTIONAL_ASSESSMENT: 0-10

## 2024-07-15 ASSESSMENT — PAIN SCALES - GENERAL
PAINLEVEL_OUTOF10: 0 - NO PAIN

## 2024-07-15 ASSESSMENT — COLUMBIA-SUICIDE SEVERITY RATING SCALE - C-SSRS
6. HAVE YOU EVER DONE ANYTHING, STARTED TO DO ANYTHING, OR PREPARED TO DO ANYTHING TO END YOUR LIFE?: NO
2. HAVE YOU ACTUALLY HAD ANY THOUGHTS OF KILLING YOURSELF?: NO
1. IN THE PAST MONTH, HAVE YOU WISHED YOU WERE DEAD OR WISHED YOU COULD GO TO SLEEP AND NOT WAKE UP?: NO

## 2024-07-15 ASSESSMENT — ACTIVITIES OF DAILY LIVING (ADL): LACK_OF_TRANSPORTATION: NO

## 2024-07-15 NOTE — ANESTHESIA POSTPROCEDURE EVALUATION
Patient: Eryn Hoyt    Procedure Summary       Date: 07/15/24 Room / Location: [facility]    Anesthesia Start: 1008 Anesthesia Stop: 1026    Procedure: EGD Diagnosis:       Anemia due to acute blood loss      Melena    Scheduled Providers: Bam Rivera MD; Danilo Ornelas MD; Steffen Arteaga MA; Antoine Marti RN Responsible Provider: Bam Rivera MD    Anesthesia Type: MAC ASA Status: 3            Anesthesia Type: MAC    Vitals Value Taken Time   /65 07/15/24 1108   Temp 36.9 °C (98.4 °F) 07/15/24 1023   Pulse 60 07/15/24 1108   Resp 17 07/15/24 1108   SpO2 100 % 07/15/24 1108       Anesthesia Post Evaluation    Patient location during evaluation: PACU  Patient participation: complete - patient participated  Level of consciousness: awake and alert  Pain management: adequate  Airway patency: patent  Cardiovascular status: acceptable and hemodynamically stable  Respiratory status: acceptable, spontaneous ventilation and nonlabored ventilation  Hydration status: acceptable  Postoperative Nausea and Vomiting: none        There were no known notable events for this encounter.

## 2024-07-15 NOTE — PROGRESS NOTES
"   07/15/24 1201   Discharge Planning   Living Arrangements Spouse/significant other   Support Systems Spouse/significant other   Assistance Needed met with patient and spouse. about discharge plans. Patient lives with spouse , \" cares for spouse\", patient independent with all care. admit for gi bleed. She has been on eliquis since 2017 when she had cva. had egd today, for colonoscopy tomorrow. Patient lives in [place]. she drove here; plans on driving back   Type of Residence Private residence   Do you have animals or pets at home? Yes   Type of Animals or Pets dog   Financial Resource Strain   How hard is it for you to pay for the very basics like food, housing, medical care, and heating? Not hard   Transportation Needs   In the past 12 months, has lack of transportation kept you from medical appointments or from getting medications? no   In the past 12 months, has lack of transportation kept you from meetings, work, or from getting things needed for daily living? No     Eryn Hoyt is a 79 y.o. female on day 1 of admission presenting with Gastrointestinal hemorrhage with hematemesis.    Laurie Peng RN      "

## 2024-07-15 NOTE — ANESTHESIA PREPROCEDURE EVALUATION
Patient: Eryn Hoyt    Procedure Information       Date/Time: 07/15/24 0700    Scheduled providers: Bam Rivera MD    Procedure: EGD    Location: [facility]            Relevant Problems   Cardiac   (+) Angina concurrent with and due to arteriosclerosis of coronary artery (CMS-HCC)   (+) Coronary artery disease involving native coronary artery of native heart without angina pectoris   (+) Dyslipidemia, goal LDL below 70   (+) Mixed hyperlipidemia   (+) NSTEMI (non-ST elevated myocardial infarction) (Multi)   (+) Patent foramen ovale (HHS-HCC)   (+) Primary hypertension      Neuro   (+) Cerebrovascular accident (CVA) (Multi)      GI   (+) GI bleed   (+) Gastrointestinal hemorrhage with hematemesis      Hematology   (+) Anemia due to acute blood loss      Musculoskeletal   (+) Osteoarthritis of right hip       Clinical information reviewed:   Tobacco  Allergies  Meds  Problems  Med Hx  Surg Hx  OB Status    Fam Hx  Soc Hx         Past Medical History:   Diagnosis Date    CAD (coronary artery disease)     NSTEMI 2017    CVA (cerebral vascular accident) (Multi) 2016    Delayed emergence from general anesthesia     HTN (hypertension)     Hyperlipidemia       Past Surgical History:   Procedure Laterality Date    CARDIAC CATHETERIZATION  04/11/2017    CONCLUSIONS:  1. Critical obtuse marginal coronary artery stenosis, most likely the     culprit for her non-ST elevation myocardial infarction  Too small to place any stent and very torutuous.  2. Perclose arteriotomy closure.    LAPAROSCOPY DIAGNOSTIC / BIOPSY / ASPIRATION / LYSIS      PARATHYROIDECTOMY  03/22/2019    TOTAL HIP ARTHROPLASTY Right 03/03/2015     Social History     Tobacco Use    Smoking status: Never    Smokeless tobacco: Never   Vaping Use    Vaping status: Never Used   Substance Use Topics    Alcohol use: Not Currently    Drug use: Never      Current Outpatient Medications   Medication Instructions    alendronate (FOSAMAX) 10 mg,  "oral, Every 7 days, Take in the morning with a full glass of water, on an empty stomach, and do not take anything else by mouth or lie down for the next 30 min. On Mondays.    apixaban (ELIQUIS) 5 mg, oral, 2 times daily    ascorbic acid (VITAMIN C) 1,000 mg, oral, Daily    calcium carbonate (Oscal) 500 mg calcium (1,250 mg) tablet 1 tablet, oral, Daily    cholecalciferol, vitamin D3, (VITAMIN D3 ORAL) 50 mcg, oral, Daily    cyanocobalamin (VITAMIN B-12) 2,500 mcg, oral, Daily    ezetimibe (ZETIA) 10 mg, oral, Daily    metoprolol succinate XL (TOPROL-XL) 12.5 mg, oral, Daily, Do not crush or chew.    omega-3 acid ethyl esters (LOVAZA) 1 g, oral, 2 times daily      Allergies   Allergen Reactions    Statins-Hmg-Coa Reductase Inhibitors Myalgia        Chemistry    Lab Results   Component Value Date/Time     07/13/2024 2308    K 4.1 07/13/2024 2308     (H) 07/13/2024 2308    CO2 21 07/13/2024 2308    BUN 56 (H) 07/13/2024 2308    CREATININE 0.79 07/13/2024 2308    Lab Results   Component Value Date/Time    CALCIUM 9.3 07/13/2024 2308    ALKPHOS 40 07/13/2024 2308    AST 12 07/13/2024 2308    ALT 7 07/13/2024 2308    BILITOT 0.6 07/13/2024 2308          Lab Results   Component Value Date    HGBA1C 6.0 (H) 09/12/2023     Lab Results   Component Value Date/Time    WBC 11.5 (H) 07/13/2024 2308    HGB 8.5 (L) 07/15/2024 0043    HCT 25.1 (L) 07/15/2024 0043     07/13/2024 2308     Lab Results   Component Value Date/Time    PROTIME 14.5 (H) 07/13/2024 2308    INR 1.3 (H) 07/13/2024 2308     No results found for: \"ABORH\"  Encounter Date: 07/13/24   ECG 12 lead   Result Value    Ventricular Rate 123    Atrial Rate 123    IA Interval 135    QRS Duration 87    QT Interval 326    QTC Calculation(Bazett) 467    P Axis 53    R Axis -11    T Axis 68    QRS Count 20    Q Onset 252    T Offset 415    QTC Fredericia 414    Narrative    Sinus tachycardia  Ventricular premature complex  Aberrant conduction of SV " complex(es)  Left atrial enlargement  Low voltage, precordial leads  Consider anterior infarct  Borderline ST depression, lateral leads     No results found for this or any previous visit from the past 1095 days.   Echo 7/10/2023@[facility]:  Narrative      The left ventricular EF by visual approximation is 55-60%. Normal left  ventricular systolic function.    ( Strain Global -15.4% ) Global longitudinal strain is mildly abnormal.    There is mild aortic valve regurgitation.    There is mild to moderate mitral valve regurgitation.    There is mild to moderate tricuspid valve regurgitation.    Previous echo performed: 7/24/2020. Changes compared to previous  echocardiogram include: Slight progression of aortic regurgitation and  tricuspid regurgitation compared to the prior study.    A complete echo was performed using color flow Doppler, spectral  Doppler and M-mode.    Left Ventricle  Left ventricle cavity appears normal. There is moderate septal asymmetric left ventricle hypertrophy with no LVOT gradient. The left ventricular EF by visual approximation is 55-60%. Normal left ventricular systolic function. Left ventricular wall motion is within normal limits. Indeterminate left ventricular diastolic function. ( Strain Global -15.4% ) Global longitudinal strain is mildly abnormal.    Right Ventricle  Right ventricle cavity appears normal. Right ventricular systolic function is normal.    Left Atrium  Left atrium appears normal.    Right Atrium  The right atrium appears normal.    IVC/SVC  The inferior vena cava demonstrates a diameter of <=21 mm and collapses >50%; the right atrial pressure is estimated at 3 mmHg.    Mitral Valve  Mitral valve structure is normal. There is mild mitral annular calcification. There is mild to moderate mitral valve regurgitation. There is no evidence of mitral valve stenosis.    Tricuspid Valve  Tricuspid valve structure is normal. There is mild to moderate tricuspid valve  regurgitation. There is no evidence of tricuspid valve stenosis. There is no pulmonary hypertension.    Aortic Valve  The aortic valve is tricuspid. There is mild aortic valve sclerosis. There is mild aortic valve regurgitation. There is no evidence of aortic valve stenosis.    Pulmonic Valve  The pulmonic valve was not well visualized. There is no pulmonic valve regurgitation or stenosis.    Ascending Aorta  The aortic root appears normal in size. There is no coarctation of the aorta.    Pericardium  The pericardium appears normal. There is no pericardial effusion.    Compared to Previous  Previous echo performed: 7/24/2020. Changes compared to previous echocardiogram include: Slight progression of aortic regurgitation and tricuspid regurgitation compared to the prior study.    Cath 4/11/2017:  Left Main Coronary Artery:  The left main coronary artery has no occlusive   disease.     Left Anterior Descending Coronary Artery:  This artery has no occlusive   disease.  It has 1 large diagonal without any occlusive disease.     Ramus Intermedius:  The ramus intermedius coronary artery has no   significant occlusive disease.     Left Circumflex Coronary Artery:  Technically non-dominant vessel.  This   has 2 obtuse marginals.  The first obtuse marginal has in its mid segment   radiolucency consistent with clot and  with stenosis of around 90% or so.  Just   beyond this there is a 99% stenosis.  This is a relatively small caliber   vessel and extremely tortuous.  This was not intervened upon.     Right Coronary Artery:  This is a technically dominant vessel.  This has no   occlusive disease.     CONCLUSIONS:   1. Critical obtuse marginal coronary artery stenosis, most likely the      culprit for her non-ST elevation myocardial infarction  Too small to place any stent and very torutuous.   2. Perclose arteriotomy closure.     Visit Vitals  /65   Pulse 80   Temp 36.6 °C (97.9 °F) (Temporal)   Resp 17   Ht 1.524 m (5')    Wt 58.5 kg (128 lb 15.5 oz)   SpO2 94%   BMI 25.19 kg/m²   OB Status Postmenopausal   Smoking Status Never   BSA 1.57 m²     NPO/Void Status  Carbohydrate Drink Given Prior to Surgery? : N  Date of Last Liquid: 07/15/24  Time of Last Liquid: 0000  Date of Last Solid: 07/15/24  Time of Last Solid: 0000  Last Intake Type: Clear fluids  Time of Last Void: 0900         Physical Exam    Airway  Mallampati: II  TM distance: >3 FB  Neck ROM: full     Cardiovascular   Rhythm: regular  Rate: normal     Dental - normal exam     Pulmonary   Breath sounds clear to auscultation     Abdominal - normal exam              Anesthesia Plan    History of general anesthesia?: yes  History of complications of general anesthesia?: no    ASA 3     MAC   (With standard ASA monitoring.)  intravenous induction   Anesthetic plan and risks discussed with patient.    Plan discussed with CRNA and CAA.

## 2024-07-15 NOTE — H&P
Between 7AM-7PM please message me via Epic Secure Chat.  After 7PM please page Nocturnist on call.    [facility] History and Physical    Eryn Hoyt    :  1945(79 y.o.)    MRN:  [id number]  Date: 24     Assessment and Plan:      GI bleed  - GI consulted. Suspect UGIB given melena and hematemesis IV PPI bid, CLD. GI consulted, NPO at midnight for EGD tomorrow.    ABLA  - due to GIB; monitor Hgb and transfuse prn active bleeding or Hgb <7    PAF  - hold eliquis due to GIB  - hold toprol xl due to soft BP    HLD  - continue statin    B12 Def  - continue home supplement    DVT Prophylaxis: SCDs      Disposition: Admit to inpatient,  await clinical improvement and treatment response, and await consultant recommendations    Electronically signed by Sergio Cross DO on 24 at 9:19 PM     Subjective:      Chief Complaint: GI bleed  PCP: No Assigned PCP Generic Provider, MD     HPI:    Eryn Hoyt is a 79 y.o. female who presented to [facility] ER for evaluation of melena and hematemesis. Reports she had 2 episodes of melena around 10am on . Then in the evening had an episode of coffee ground emesis around 10pm. NO further melena, BM or hematemesis since then. After the episode of hematemesis felt diaphoretic and weak prompting her to go to the ER.     Last dose of eliquis was on the AM of .     Currently no chest pain or shortness of breath. No diaphoresis. Still feels weak.      No past medical history on file.    No past surgical history on file.    No family history on file.    Social History     Socioeconomic History    Marital status:      Spouse name: Not on file    Number of children: Not on file    Years of education: Not on file    Highest education level: Not on file   Occupational History    Not on file   Tobacco Use    Smoking status: Never    Smokeless tobacco: Never   Vaping Use    Vaping status: Never Used   Substance and Sexual Activity    Alcohol use: Not on  file    Drug use: Never    Sexual activity: Not on file   Other Topics Concern    Not on file   Social History Narrative    Not on file     Social Determinants of Health     Financial Resource Strain: Low Risk  (7/14/2024)    Overall Financial Resource Strain (CARDIA)     Difficulty of Paying Living Expenses: Not hard at all   Food Insecurity: Not on file   Transportation Needs: No Transportation Needs (7/14/2024)    PRAPARE - Transportation     Lack of Transportation (Medical): No     Lack of Transportation (Non-Medical): No   Physical Activity: Not on file   Stress: Not on file   Social Connections: Not on file   Intimate Partner Violence: Not on file   Housing Stability: Low Risk  (7/14/2024)    Housing Stability Vital Sign     Unable to Pay for Housing in the Last Year: No     Number of Times Moved in the Last Year: 1     Homeless in the Last Year: No       Allergies   Allergen Reactions    Statins-Hmg-Coa Reductase Inhibitors Myalgia       Prior to Admission medications    Medication Sig Start Date End Date Taking? Authorizing Provider   apixaban (Eliquis) 5 mg tablet Take 1 tablet (5 mg) by mouth 2 times a day.   Yes Historical Provider, MD   alendronate (Fosamax) 10 mg tablet Take 1 tablet (10 mg) by mouth every 7 days. Take in the morning with a full glass of water, on an empty stomach, and do not take anything else by mouth or lie down for the next 30 min. On Mondays.    Historical Provider, MD   ascorbic acid (Vitamin C) 1,000 mg tablet Take 1 tablet (1,000 mg) by mouth once daily.    Historical Provider, MD   calcium carbonate (Oscal) 500 mg calcium (1,250 mg) tablet Take 1 tablet (1,250 mg) by mouth once daily.    Historical Provider, MD   cholecalciferol, vitamin D3, (VITAMIN D3 ORAL) Take 50 mcg by mouth once daily.    Historical Provider, MD   cyanocobalamin (Vitamin B-12) 1,000 mcg tablet Take 2.5 tablets (2,500 mcg) by mouth once daily.    Historical Provider, MD   ezetimibe (Zetia) 10 mg tablet  Take 1 tablet (10 mg) by mouth once daily.    Historical Provider, MD   metoprolol succinate XL (Toprol-XL) 25 mg 24 hr tablet Take 0.5 tablets (12.5 mg) by mouth once daily. Do not crush or chew.    Historical Provider, MD   omega-3 acid ethyl esters (Lovaza) 1 gram capsule Take 1 capsule (1 g) by mouth 2 times a day.    Historical Provider, MD       Review of systems:   Other than patient's chronic conditions and those complaints in the history above, the rest of the 10 systems review were done and were negative.     Objective:      Vitals:    07/14/24 0900 07/14/24 1531   BP: 114/64 123/78   BP Location: Right arm Right arm   Patient Position: Sitting Lying   Pulse: 107 105   Resp: 22 18   Temp: 36.9 °C (98.5 °F) 36.8 °C (98.2 °F)   TempSrc: Oral Oral   SpO2: 99% 95%        Physical Exam  Vitals and nursing note reviewed.   HENT:      Mouth/Throat:      Mouth: Mucous membranes are moist.      Pharynx: Oropharynx is clear.   Cardiovascular:      Rate and Rhythm: Regular rhythm. Tachycardia present.   Pulmonary:      Effort: Pulmonary effort is normal.   Abdominal:      General: There is no distension.      Palpations: Abdomen is soft.      Tenderness: There is no abdominal tenderness.   Neurological:      Mental Status: She is alert and oriented to person, place, and time.         Labs:   Lab Results   Component Value Date     07/13/2024    K 4.1 07/13/2024     (H) 07/13/2024    CO2 21 07/13/2024    BUN 56 (H) 07/13/2024    CREATININE 0.79 07/13/2024    GLUCOSE 180 (H) 07/13/2024    CALCIUM 9.3 07/13/2024    PROT 6.1 (L) 07/13/2024    BILITOT 0.6 07/13/2024    ALKPHOS 40 07/13/2024    AST 12 07/13/2024    ALT 7 07/13/2024       Lab Results   Component Value Date    WBC 11.5 (H) 07/13/2024    HGB 9.1 (L) 07/14/2024    HCT 28.1 (L) 07/14/2024    MCV 96 07/13/2024     07/13/2024       Imaging:   CT angio abdomen pelvis w and or wo IV IV contrast    Result Date: 7/14/2024  Interpreted By:  Peewee  Antione, STUDY: CT ANGIO ABDOMEN PELVIS W AND/OR WO IV IV CONTRAST;  7/14/2024 12:26 am   INDICATION: Signs/Symptoms:active GIB (0.3-0.5 cc blood/minute) in conjunction with GI or intensivist consult.   COMPARISON: None.   ACCESSION NUMBER(S): [id number]   ORDERING CLINICIAN: STEPHANIE LENZ   TECHNIQUE: Contiguous axial images of the abdomen and pelvis were obtained with and without intravenous contrast. Coronal and sagittal reformatted images were obtained from the axial images. MIPS and 3D reformatted images were also performed and reviewed.   FINDINGS: No basilar airspace disease. No pleural effusion.   10 mm focus of enhancement in the left hepatic lobe may correspond to a hemangioma. A subtle hypodensity right hepatic lobe is too small to characterize. The gallbladder is present. No significant dilatation common bile duct.   The pancreas and spleen appear unremarkable.   1.3 cm left adrenal nodule. The right adrenal gland appears unremarkable.   There are bowel subcentimeter hypodensities are too small to characterize. Small areas of cortical scarring in the kidneys also noted. No hydronephrosis.   Underdistention versus wall thickening of the gastric antrum. No evidence of bowel obstruction. There is colonic diverticulosis without evidence of acute diverticulitis. There is underdistention versus wall thickening of the sigmoid colon.   No evidence of abdominal aortic aneurysm or dissection. The celiac artery, superior mesenteric artery, bilateral renal arteries, inferior mesenteric artery are patent. There is no evidence of extravasation of contrast in bowel lumen to suggest acute active gastrointestinal bleed.   Postsurgical change of right hip arthroplasty resulting in beam hardening artifact limits evaluation of the pelvis.   Limited evaluation the urinary bladder secondary to underdistention and beam hardening artifact.   Multilevel degenerative change of the lumbar spine.       No evidence of  extravasation of contrast in bowel lumen to suggest acute active gastrointestinal bleed. Given the patient's reported gastrointestinal bleed, gastroenterology evaluation is recommended for further assessment.   Colonic diverticulosis without evidence of acute diverticulitis. Underdistention versus wall thickening of the sigmoid colon which may be infectious or inflammatory in etiology.   MACRO: None   Signed by: Antione Vides 7/14/2024 1:17 AM Dictation workstation:   [id number]

## 2024-07-15 NOTE — SIGNIFICANT EVENT
GI Update    EGD with small hiatal hernia but no obvious source of anemia or bleeding. Will discuss colonoscopy with the patient. Trend hemoglobin. Ok for clears today then bowel prep this evening and colonoscopy tomorrow.    GI will follow.

## 2024-07-16 ENCOUNTER — APPOINTMENT (OUTPATIENT)
Dept: GASTROENTEROLOGY | Facility: HOSPITAL | Age: 79
DRG: 378 | End: 2024-07-16
Payer: MEDICARE

## 2024-07-16 ENCOUNTER — ANESTHESIA EVENT (OUTPATIENT)
Dept: GASTROENTEROLOGY | Facility: HOSPITAL | Age: 79
DRG: 378 | End: 2024-07-16
Payer: MEDICARE

## 2024-07-16 ENCOUNTER — ANESTHESIA (OUTPATIENT)
Dept: GASTROENTEROLOGY | Facility: HOSPITAL | Age: 79
DRG: 378 | End: 2024-07-16
Payer: MEDICARE

## 2024-07-16 ENCOUNTER — APPOINTMENT (OUTPATIENT)
Dept: CARDIOLOGY | Facility: HOSPITAL | Age: 79
DRG: 378 | End: 2024-07-16
Payer: MEDICARE

## 2024-07-16 VITALS
HEART RATE: 96 BPM | HEIGHT: 60 IN | OXYGEN SATURATION: 96 % | WEIGHT: 128.97 LBS | SYSTOLIC BLOOD PRESSURE: 111 MMHG | DIASTOLIC BLOOD PRESSURE: 68 MMHG | BODY MASS INDEX: 25.32 KG/M2 | RESPIRATION RATE: 18 BRPM | TEMPERATURE: 97.8 F

## 2024-07-16 LAB
ANION GAP SERPL CALC-SCNC: 9 MMOL/L (ref 10–20)
BUN SERPL-MCNC: 12 MG/DL (ref 6–23)
CALCIUM SERPL-MCNC: 7.9 MG/DL (ref 8.6–10.3)
CHLORIDE SERPL-SCNC: 109 MMOL/L (ref 98–107)
CO2 SERPL-SCNC: 28 MMOL/L (ref 21–32)
CREAT SERPL-MCNC: 0.7 MG/DL (ref 0.5–1.05)
EGFRCR SERPLBLD CKD-EPI 2021: 89 ML/MIN/1.73M*2
ERYTHROCYTE [DISTWIDTH] IN BLOOD BY AUTOMATED COUNT: 12.9 % (ref 11.5–14.5)
GLUCOSE SERPL-MCNC: 88 MG/DL (ref 74–99)
HCT VFR BLD AUTO: 24.1 % (ref 36–46)
HGB BLD-MCNC: 8.5 G/DL (ref 12–16)
MCH RBC QN AUTO: 32.8 PG (ref 26–34)
MCHC RBC AUTO-ENTMCNC: 35.3 G/DL (ref 32–36)
MCV RBC AUTO: 93 FL (ref 80–100)
NRBC BLD-RTO: 0 /100 WBCS (ref 0–0)
PLATELET # BLD AUTO: 167 X10*3/UL (ref 150–450)
POTASSIUM SERPL-SCNC: 3.8 MMOL/L (ref 3.5–5.3)
RBC # BLD AUTO: 2.59 X10*6/UL (ref 4–5.2)
SODIUM SERPL-SCNC: 142 MMOL/L (ref 136–145)
WBC # BLD AUTO: 5 X10*3/UL (ref 4.4–11.3)

## 2024-07-16 PROCEDURE — 2500000004 HC RX 250 GENERAL PHARMACY W/ HCPCS (ALT 636 FOR OP/ED): Performed by: HOSPITALIST

## 2024-07-16 PROCEDURE — 45385 COLONOSCOPY W/LESION REMOVAL: CPT

## 2024-07-16 PROCEDURE — 36415 COLL VENOUS BLD VENIPUNCTURE: CPT | Performed by: HOSPITALIST

## 2024-07-16 PROCEDURE — 80048 BASIC METABOLIC PNL TOTAL CA: CPT | Performed by: HOSPITALIST

## 2024-07-16 PROCEDURE — 2500000002 HC RX 250 W HCPCS SELF ADMINISTERED DRUGS (ALT 637 FOR MEDICARE OP, ALT 636 FOR OP/ED): Performed by: HOSPITALIST

## 2024-07-16 PROCEDURE — 85027 COMPLETE CBC AUTOMATED: CPT | Performed by: HOSPITALIST

## 2024-07-16 PROCEDURE — 99100 ANES PT EXTEME AGE<1 YR&>70: CPT | Performed by: ANESTHESIOLOGY

## 2024-07-16 PROCEDURE — 3700000002 HC GENERAL ANESTHESIA TIME - EACH INCREMENTAL 1 MINUTE

## 2024-07-16 PROCEDURE — A45378 PR COLONOSCOPY,DIAGNOSTIC: Performed by: NURSE ANESTHETIST, CERTIFIED REGISTERED

## 2024-07-16 PROCEDURE — 3700000001 HC GENERAL ANESTHESIA TIME - INITIAL BASE CHARGE

## 2024-07-16 PROCEDURE — A45378 PR COLONOSCOPY,DIAGNOSTIC: Performed by: ANESTHESIOLOGY

## 2024-07-16 PROCEDURE — 2500000001 HC RX 250 WO HCPCS SELF ADMINISTERED DRUGS (ALT 637 FOR MEDICARE OP): Performed by: HOSPITALIST

## 2024-07-16 PROCEDURE — 45385 COLONOSCOPY W/LESION REMOVAL: CPT | Performed by: INTERNAL MEDICINE

## 2024-07-16 PROCEDURE — C9113 INJ PANTOPRAZOLE SODIUM, VIA: HCPCS | Performed by: HOSPITALIST

## 2024-07-16 PROCEDURE — 0DBN8ZZ EXCISION OF SIGMOID COLON, VIA NATURAL OR ARTIFICIAL OPENING ENDOSCOPIC: ICD-10-PCS | Performed by: INTERNAL MEDICINE

## 2024-07-16 PROCEDURE — 2500000004 HC RX 250 GENERAL PHARMACY W/ HCPCS (ALT 636 FOR OP/ED): Performed by: NURSE ANESTHETIST, CERTIFIED REGISTERED

## 2024-07-16 PROCEDURE — 93005 ELECTROCARDIOGRAM TRACING: CPT

## 2024-07-16 PROCEDURE — 7100000009 HC PHASE TWO TIME - INITIAL BASE CHARGE

## 2024-07-16 PROCEDURE — 99232 SBSQ HOSP IP/OBS MODERATE 35: CPT | Performed by: STUDENT IN AN ORGANIZED HEALTH CARE EDUCATION/TRAINING PROGRAM

## 2024-07-16 PROCEDURE — 7100000010 HC PHASE TWO TIME - EACH INCREMENTAL 1 MINUTE

## 2024-07-16 RX ORDER — SODIUM CHLORIDE, SODIUM LACTATE, POTASSIUM CHLORIDE, CALCIUM CHLORIDE 600; 310; 30; 20 MG/100ML; MG/100ML; MG/100ML; MG/100ML
20 INJECTION, SOLUTION INTRAVENOUS CONTINUOUS
Status: CANCELLED | OUTPATIENT
Start: 2024-07-16

## 2024-07-16 RX ORDER — PANTOPRAZOLE SODIUM 40 MG/1
40 TABLET, DELAYED RELEASE ORAL DAILY
Qty: 90 TABLET | Refills: 0 | Status: SHIPPED | OUTPATIENT
Start: 2024-07-16

## 2024-07-16 RX ORDER — SODIUM CHLORIDE, SODIUM LACTATE, POTASSIUM CHLORIDE, CALCIUM CHLORIDE 600; 310; 30; 20 MG/100ML; MG/100ML; MG/100ML; MG/100ML
INJECTION, SOLUTION INTRAVENOUS CONTINUOUS PRN
Status: DISCONTINUED | OUTPATIENT
Start: 2024-07-16 | End: 2024-07-16

## 2024-07-16 RX ORDER — PROPOFOL 10 MG/ML
INJECTION, EMULSION INTRAVENOUS AS NEEDED
Status: DISCONTINUED | OUTPATIENT
Start: 2024-07-16 | End: 2024-07-16

## 2024-07-16 RX ADMIN — PANTOPRAZOLE SODIUM 40 MG: 40 INJECTION, POWDER, FOR SOLUTION INTRAVENOUS at 09:58

## 2024-07-16 RX ADMIN — Medication 2000 UNITS: at 09:57

## 2024-07-16 RX ADMIN — CYANOCOBALAMIN TAB 500 MCG 2500 MCG: 500 TAB at 09:58

## 2024-07-16 RX ADMIN — EZETIMIBE 10 MG: 10 TABLET ORAL at 09:58

## 2024-07-16 ASSESSMENT — PAIN SCALES - GENERAL
PAINLEVEL_OUTOF10: 0 - NO PAIN

## 2024-07-16 ASSESSMENT — ENCOUNTER SYMPTOMS
VOMITING: 0
ABDOMINAL PAIN: 0
FEVER: 0
BLOOD IN STOOL: 1
ABDOMINAL DISTENTION: 0
SHORTNESS OF BREATH: 0

## 2024-07-16 ASSESSMENT — PAIN - FUNCTIONAL ASSESSMENT
PAIN_FUNCTIONAL_ASSESSMENT: 0-10

## 2024-07-16 NOTE — ANESTHESIA PREPROCEDURE EVALUATION
Patient: Eryn Hoyt    Procedure Information       Date/Time: 07/16/24 0700    Procedure: COLONOSCOPY    Location: [facility]            Relevant Problems   Cardiac   (+) Angina concurrent with and due to arteriosclerosis of coronary artery (CMS-HCC)   (+) Coronary artery disease involving native coronary artery of native heart without angina pectoris   (+) Dyslipidemia, goal LDL below 70   (+) Mixed hyperlipidemia   (+) NSTEMI (non-ST elevated myocardial infarction) (Multi)   (+) Patent foramen ovale (HHS-HCC)   (+) Primary hypertension      Neuro   (+) Cerebrovascular accident (CVA) (Multi)      GI   (+) GI bleed   (+) Gastrointestinal hemorrhage with hematemesis      Hematology   (+) Anemia due to acute blood loss      Musculoskeletal   (+) Osteoarthritis of right hip       Clinical information reviewed:   Tobacco  Allergies  Meds  Problems  Med Hx  Surg Hx  OB Status    Fam Hx  Soc Hx         Past Medical History:   Diagnosis Date    CAD (coronary artery disease)     NSTEMI 2017    CVA (cerebral vascular accident) (Multi) 2016    Delayed emergence from general anesthesia     HTN (hypertension)     Hyperlipidemia       Past Surgical History:   Procedure Laterality Date    CARDIAC CATHETERIZATION  04/11/2017    CONCLUSIONS:  1. Critical obtuse marginal coronary artery stenosis, most likely the     culprit for her non-ST elevation myocardial infarction  Too small to place any stent and very torutuous.  2. Perclose arteriotomy closure.    LAPAROSCOPY DIAGNOSTIC / BIOPSY / ASPIRATION / LYSIS      PARATHYROIDECTOMY  03/22/2019    TOTAL HIP ARTHROPLASTY Right 03/03/2015     Social History     Tobacco Use    Smoking status: Never    Smokeless tobacco: Never   Vaping Use    Vaping status: Never Used   Substance Use Topics    Alcohol use: Not Currently    Drug use: Never      Current Outpatient Medications   Medication Instructions    alendronate (FOSAMAX) 10 mg, oral, Every 7 days, Take in the  "morning with a full glass of water, on an empty stomach, and do not take anything else by mouth or lie down for the next 30 min. On Mondays.    apixaban (ELIQUIS) 5 mg, oral, 2 times daily    ascorbic acid (VITAMIN C) 1,000 mg, oral, Daily    calcium carbonate (Oscal) 500 mg calcium (1,250 mg) tablet 1 tablet, oral, Daily    cholecalciferol, vitamin D3, (VITAMIN D3 ORAL) 50 mcg, oral, Daily    cyanocobalamin (VITAMIN B-12) 2,500 mcg, oral, Daily    ezetimibe (ZETIA) 10 mg, oral, Daily    metoprolol succinate XL (TOPROL-XL) 12.5 mg, oral, Daily, Do not crush or chew.    omega-3 acid ethyl esters (LOVAZA) 1 g, oral, 2 times daily      Allergies   Allergen Reactions    Statins-Hmg-Coa Reductase Inhibitors Myalgia        Chemistry    Lab Results   Component Value Date/Time     07/16/2024 0634    K 3.8 07/16/2024 0634     (H) 07/16/2024 0634    CO2 28 07/16/2024 0634    BUN 12 07/16/2024 0634    CREATININE 0.70 07/16/2024 0634    Lab Results   Component Value Date/Time    CALCIUM 7.9 (L) 07/16/2024 0634    ALKPHOS 40 07/13/2024 2308    AST 12 07/13/2024 2308    ALT 7 07/13/2024 2308    BILITOT 0.6 07/13/2024 2308          Lab Results   Component Value Date    HGBA1C 6.0 (H) 09/12/2023     Lab Results   Component Value Date/Time    WBC 5.0 07/16/2024 0634    HGB 8.5 (L) 07/16/2024 0634    HCT 24.1 (L) 07/16/2024 0634     07/16/2024 0634     Lab Results   Component Value Date/Time    PROTIME 14.5 (H) 07/13/2024 2308    INR 1.3 (H) 07/13/2024 2308     No results found for: \"ABORH\"  Encounter Date: 07/13/24   ECG 12 lead   Result Value    Ventricular Rate 123    Atrial Rate 123    GA Interval 135    QRS Duration 87    QT Interval 326    QTC Calculation(Bazett) 467    P Axis 53    R Axis -11    T Axis 68    QRS Count 20    Q Onset 252    T Offset 415    QTC Fredericia 414    Narrative    Sinus tachycardia  Ventricular premature complex  Aberrant conduction of SV complex(es)  Left atrial enlargement  Low " voltage, precordial leads  Consider anterior infarct  Borderline ST depression, lateral leads    See ED provider note for full interpretation and clinical correlation  Confirmed by Alison Pastrana ([id number]) on 7/15/2024 11:39:47 AM     No results found for this or any previous visit from the past 1095 days.    Echo 7/10/2023@[facility]:  Narrative       The left ventricular EF by visual approximation is 55-60%. Normal left  ventricular systolic function.    ( Strain Global -15.4% ) Global longitudinal strain is mildly abnormal.    There is mild aortic valve regurgitation.    There is mild to moderate mitral valve regurgitation.    There is mild to moderate tricuspid valve regurgitation.    Previous echo performed: 7/24/2020. Changes compared to previous  echocardiogram include: Slight progression of aortic regurgitation and  tricuspid regurgitation compared to the prior study.    A complete echo was performed using color flow Doppler, spectral  Doppler and M-mode.    Left Ventricle  Left ventricle cavity appears normal. There is moderate septal asymmetric left ventricle hypertrophy with no LVOT gradient. The left ventricular EF by visual approximation is 55-60%. Normal left ventricular systolic function. Left ventricular wall motion is within normal limits. Indeterminate left ventricular diastolic function. ( Strain Global -15.4% ) Global longitudinal strain is mildly abnormal.    Right Ventricle  Right ventricle cavity appears normal. Right ventricular systolic function is normal.    Left Atrium  Left atrium appears normal.    Right Atrium  The right atrium appears normal.    IVC/SVC  The inferior vena cava demonstrates a diameter of <=21 mm and collapses >50%; the right atrial pressure is estimated at 3 mmHg.    Mitral Valve  Mitral valve structure is normal. There is mild mitral annular calcification. There is mild to moderate mitral valve regurgitation. There is no evidence of mitral valve  stenosis.    Tricuspid Valve  Tricuspid valve structure is normal. There is mild to moderate tricuspid valve regurgitation. There is no evidence of tricuspid valve stenosis. There is no pulmonary hypertension.    Aortic Valve  The aortic valve is tricuspid. There is mild aortic valve sclerosis. There is mild aortic valve regurgitation. There is no evidence of aortic valve stenosis.    Pulmonic Valve  The pulmonic valve was not well visualized. There is no pulmonic valve regurgitation or stenosis.    Ascending Aorta  The aortic root appears normal in size. There is no coarctation of the aorta.    Pericardium  The pericardium appears normal. There is no pericardial effusion.    Compared to Previous  Previous echo performed: 7/24/2020. Changes compared to previous echocardiogram include: Slight progression of aortic regurgitation and tricuspid regurgitation compared to the prior study.     Cath 4/11/2017:  Left Main Coronary Artery:  The left main coronary artery has no occlusive   disease.     Left Anterior Descending Coronary Artery:  This artery has no occlusive   disease.  It has 1 large diagonal without any occlusive disease.     Ramus Intermedius:  The ramus intermedius coronary artery has no   significant occlusive disease.     Left Circumflex Coronary Artery:  Technically non-dominant vessel.  This   has 2 obtuse marginals.  The first obtuse marginal has in its mid segment   radiolucency consistent with clot and  with stenosis of around 90% or so.  Just   beyond this there is a 99% stenosis.  This is a relatively small caliber   vessel and extremely tortuous.  This was not intervened upon.     Right Coronary Artery:  This is a technically dominant vessel.  This has no   occlusive disease.     CONCLUSIONS:   1. Critical obtuse marginal coronary artery stenosis, most likely the      culprit for her non-ST elevation myocardial infarction  Too small to place any stent and very torutuous.   2. Perclose arteriotomy  closure.     Visit Vitals  /65   Pulse 83   Temp 36.3 °C (97.3 °F) (Temporal)   Resp 18   Ht 1.524 m (5')   Wt 58.5 kg (128 lb 15.5 oz)   SpO2 99%   BMI 25.19 kg/m²   OB Status Postmenopausal   Smoking Status Never   BSA 1.57 m²     NPO/Void Status  Carbohydrate Drink Given Prior to Surgery? : N  Date of Last Liquid: 07/16/24  Time of Last Liquid: 0958  Date of Last Solid: 07/15/24  Time of Last Solid: 1200  Last Intake Type: Clear fluids  Time of Last Void: 1157         Physical Exam    Airway  Mallampati: III  TM distance: >3 FB  Neck ROM: full     Cardiovascular   Rhythm: regular  Rate: normal     Dental - normal exam     Pulmonary   Breath sounds clear to auscultation     Abdominal - normal exam              Anesthesia Plan    History of general anesthesia?: yes  History of complications of general anesthesia?: no    ASA 3     MAC   (With standard ASA monitoring.)  intravenous induction   Anesthetic plan and risks discussed with patient.    Plan discussed with CRNA and CAA.

## 2024-07-16 NOTE — ANESTHESIA POSTPROCEDURE EVALUATION
Patient: Eryn Hoyt    Procedure Summary       Date: 07/16/24 Room / Location: [facility]    Anesthesia Start: 1240 Anesthesia Stop: 1322    Procedure: COLONOSCOPY Diagnosis:       Anemia due to acute blood loss      Melena    Scheduled Providers: Bam Rivera MD; GERMAIN Haddad-CRNA Responsible Provider: Bam Rivera MD    Anesthesia Type: MAC ASA Status: 3            Anesthesia Type: MAC    Vitals Value Taken Time   /54 07/16/24 1352   Temp 36.2 °C (97.2 °F) 07/16/24 1322   Pulse 70 07/16/24 1352   Resp 16 07/16/24 1352   SpO2 98 % 07/16/24 1352       Anesthesia Post Evaluation    Patient location during evaluation: PACU  Patient participation: complete - patient participated  Level of consciousness: awake and alert  Pain management: adequate  Airway patency: patent  Cardiovascular status: acceptable and hemodynamically stable  Respiratory status: acceptable, spontaneous ventilation and nonlabored ventilation  Hydration status: acceptable  Postoperative Nausea and Vomiting: none        There were no known notable events for this encounter.

## 2024-07-16 NOTE — PROGRESS NOTES
[facility] Hospitalist Progress Note        Between 7AM-7PM please message me via Epic Secure Chat.  After 7PM please page Nocturnist on call.        Assessment/Plan     #GI bleed  - GI consulted. EGD with small hiatal hernia but no obvious source of anemia or bleeding. Plan for colonoscopy today to evaluate further  - continue PPI BID for now     #ABLA  - due to GIB; Hgb now stable, asymptomatic  - monitor Hgb and transfuse prn active bleeding or Hgb <7     #PAF  - hold eliquis due to GIB; discuss plans for restarting with GI pending colonoscopy results  - hold toprol xl due to soft BP     #HLD  - continue statin     #B12 Def  - continue home supplement      DVT Prophylaxis:  SCDs      Discharge Planning: Home once med ready    I personally examined the patient and reviewed chart.  Plan of care was discussed with patient, all questions answered.    Total time spent: At least 38 minutes, providing counseling or in coordination of care. Total time on this day of visit includes record and documentation review before and after visit including documentation and time not explicitly included on EMR time stamp.      Subjective     Eryn Hoyt is a 79 y.o. female on day 2 of admission presenting with Gastrointestinal hemorrhage with hematemesis.    NAEON. Overall stable, she does endorse dark, tarry bowel movement yesterday.    Review of Systems   Constitutional:  Negative for fever.   Respiratory:  Negative for shortness of breath.    Cardiovascular:  Negative for chest pain.   Gastrointestinal:  Positive for blood in stool. Negative for abdominal distention, abdominal pain and vomiting.       Objective     Physical Exam  Constitutional:       General: She is not in acute distress.  Cardiovascular:      Rate and Rhythm: Normal rate and regular rhythm.   Pulmonary:      Effort: Pulmonary effort is normal.      Breath sounds: Normal breath sounds.   Abdominal:      General: There is no distension.      Palpations: Abdomen is  soft.   Neurological:      Mental Status: She is alert. Mental status is at baseline.         Last Recorded Vitals  Blood pressure 120/72, pulse 86, temperature 37.3 °C (99.1 °F), resp. rate 17, height 1.524 m (5'), weight 58.5 kg (128 lb 15.5 oz), SpO2 98%.  Intake/Output last 3 Shifts:  I/O last 3 completed shifts:  In: 1440 (24.6 mL/kg) [P.O.:600; I.V.:840 (14.4 mL/kg)]  Out: 600 (10.3 mL/kg) [Urine:600 (0.3 mL/kg/hr)]  Weight: 58.5 kg     Relevant Results  Results for orders placed or performed during the hospital encounter of 07/14/24 (from the past 24 hour(s))   CBC   Result Value Ref Range    WBC 6.9 4.4 - 11.3 x10*3/uL    nRBC 0.0 0.0 - 0.0 /100 WBCs    RBC 2.81 (L) 4.00 - 5.20 x10*6/uL    Hemoglobin 9.1 (L) 12.0 - 16.0 g/dL    Hematocrit 27.7 (L) 36.0 - 46.0 %    MCV 99 80 - 100 fL    MCH 32.4 26.0 - 34.0 pg    MCHC 32.9 32.0 - 36.0 g/dL    RDW 13.2 11.5 - 14.5 %    Platelets 193 150 - 450 x10*3/uL   Basic metabolic panel   Result Value Ref Range    Glucose 88 74 - 99 mg/dL    Sodium 142 136 - 145 mmol/L    Potassium 3.8 3.5 - 5.3 mmol/L    Chloride 109 (H) 98 - 107 mmol/L    Bicarbonate 28 21 - 32 mmol/L    Anion Gap 9 (L) 10 - 20 mmol/L    Urea Nitrogen 12 6 - 23 mg/dL    Creatinine 0.70 0.50 - 1.05 mg/dL    eGFR 89 >60 mL/min/1.73m*2    Calcium 7.9 (L) 8.6 - 10.3 mg/dL   CBC   Result Value Ref Range    WBC 5.0 4.4 - 11.3 x10*3/uL    nRBC 0.0 0.0 - 0.0 /100 WBCs    RBC 2.59 (L) 4.00 - 5.20 x10*6/uL    Hemoglobin 8.5 (L) 12.0 - 16.0 g/dL    Hematocrit 24.1 (L) 36.0 - 46.0 %    MCV 93 80 - 100 fL    MCH 32.8 26.0 - 34.0 pg    MCHC 35.3 32.0 - 36.0 g/dL    RDW 12.9 11.5 - 14.5 %    Platelets 167 150 - 450 x10*3/uL       Imaging Results  Esophagogastroduodenoscopy (EGD)    Result Date: 7/15/2024  Table formatting from the original result was not included. Impression The esophagus appeared normal. 1 cm hiatal hernia The stomach was otherwise normal on direct and retroflexion views. The duodenal bulb, 2nd part  of the duodenum and 3rd part of the duodenum appeared normal. Findings The esophagus appeared normal. Regular Z-line 38 cm from the incisors 1 cm hiatal hernia without Bhargav lesions present The stomach was otherwise normal on direct and retroflexion views. The duodenal bulb, 2nd part of the duodenum and 3rd part of the duodenum appeared normal. Recommendation There is no recommended follow-up for this procedure. - Return the patient to the hospital [facility] or ICU (if applicable). -The patient has a contact number available for  emergencies.  The signs and symptoms of potential delayed complications were discussed with the patient.  Return to normal activities tomorrow.  Written discharge instructions were provided to the patient. - Resume previous diet. - Continue present medications. - Follow-up with the inpatient GI consult service for further recommendations. Indication Melena, Anemia due to acute blood loss Staff Staff Role Danilo Ornelas MD Proceduralist Medications See Anesthesia Record. Preprocedure A history and physical has been performed, and patient medication allergies have been reviewed. The patient's tolerance of previous anesthesia has been reviewed. The risks and benefits of the procedure and the sedation options and risks were discussed with the patient. All questions were answered and informed consent obtained. Details of the Procedure The patient underwent monitored anesthesia care, which was administered by an anesthesia professional. The patient's blood pressure, ECG, ETCO2, heart rate, level of consciousness, oxygen and respirations were monitored throughout the procedure. The scope was introduced through the mouth and advanced to the third part of the duodenum. Retroflexion was performed in the cardia, fundus and incisura. The patient experienced no blood loss. The procedure was not difficult. The patient tolerated the procedure well. There were no apparent adverse events. Events Procedure  Events Event Event Time ENDO SCOPE IN TIME 7/15/2024 10:13 AM ENDO SCOPE OUT TIME 7/15/2024 10:18 AM Specimens No specimens collected Procedure Location [address] [id number] [phone] Referring Provider Danilo Manzanares DO Procedure Provider Danilo Ornelas MD       Medications:  cholecalciferol, 2,000 Units, oral, Daily  cyanocobalamin, 2,500 mcg, oral, Daily  ezetimibe, 10 mg, oral, Daily  pantoprazole, 40 mg, intravenous, q12h              Madhuri Meehan MD  [facility]

## 2024-07-16 NOTE — SIGNIFICANT EVENT
GI Update    Colonoscopy with single polyp removed. Will follow-up pathology as outpatient. No blood throughout the colon or terminal ileum. No source of bleeding identified. Hemoglobin stable and no ongoing overt bleeding. Ok for diet and discharge home. Discussed capsule endoscopy with the patient and she reports that she prefers to do this at home in [place] where she will be returning after discharge.    GI will sign-off.

## 2024-07-16 NOTE — CARE PLAN
The patient's goals for the shift include   have successful procedure and return to normal diet    The clinical goals for the shift include remain safe thorughout shift    Over the shift, the patient did make progress toward the following goals.

## 2024-07-16 NOTE — PROGRESS NOTES
Between 7AM-7PM please message me via Epic Secure Chat.  After 7PM please page Nocturnist on call.    [facility] Hospitalist Progress Note      Eryn Hoyt    :  1945(79 y.o.)    MRN:  [id number]  Date: 07/15/24     Assessment and Plan:     GI bleed  - GI consulted. EGD with small hiatal hernia but no obvious source of anemia or bleeding. Plan for colonoscopy to evaluate further.     ABLA  - due to GIB; Hgb now stable, asymptomatic  - monitor Hgb and transfuse prn active bleeding or Hgb <7     PAF  - hold eliquis due to GIB; discuss plans for restarting with GI pending colonoscopy results  - hold toprol xl due to soft BP     HLD  - continue statin     B12 Def  - continue home supplement      DVT Prophylaxis: SCDs    Disposition: continue to monitor inpatient, await consultant recommendations, await test results, and await clinical improvement    Electronically signed by Sergio Cross DO on 07/15/24 at 9:30 PM     Subjective:      Interval History:   Vitals and chart notes from overnight reviewed.   No acute issues overnight.   Patient seen and evaluated at bedside.   Reports she had melena overnight. No chest pain or shortness of breath. No lightheadeness or dizziness. Able to ambulate to bathroom.    Review of Systems:   Other than patient's chronic conditions and those complaints in the history above, the rest of the 10 systems review were done and were negative.     Current medications:  Scheduled Meds:cholecalciferol, 2,000 Units, oral, Daily  cyanocobalamin, 2,500 mcg, oral, Daily  ezetimibe, 10 mg, oral, Daily  pantoprazole, 40 mg, intravenous, q12h      Continuous Infusions:   PRN Meds:      Objective:     Heart Rate:  [60-97]   Temp:  [36.6 °C (97.8 °F)-37.2 °C (98.9 °F)]   Resp:  [16-18]   BP: (105-138)/(56-77)   Height:  [152.4 cm (5')]   Weight:  [58.5 kg (128 lb 15.5 oz)]   SpO2:  [94 %-100 %]          Physical Exam  Vitals and nursing note reviewed.   HENT:      Mouth/Throat:       Mouth: Mucous membranes are moist.      Pharynx: Oropharynx is clear.   Cardiovascular:      Rate and Rhythm: Normal rate and regular rhythm.   Pulmonary:      Effort: Pulmonary effort is normal.   Abdominal:      Palpations: Abdomen is soft.   Neurological:      Mental Status: She is alert.         Labs:   Lab Results   Component Value Date     07/13/2024    K 4.1 07/13/2024     (H) 07/13/2024    CO2 21 07/13/2024    BUN 56 (H) 07/13/2024    CREATININE 0.79 07/13/2024    GLUCOSE 180 (H) 07/13/2024    CALCIUM 9.3 07/13/2024    PROT 6.1 (L) 07/13/2024    BILITOT 0.6 07/13/2024    ALKPHOS 40 07/13/2024    AST 12 07/13/2024    ALT 7 07/13/2024       Lab Results   Component Value Date    WBC 6.9 07/15/2024    HGB 9.1 (L) 07/15/2024    HCT 27.7 (L) 07/15/2024    MCV 99 07/15/2024     07/15/2024

## 2024-07-16 NOTE — PROGRESS NOTES
07/16/24 0948   Discharge Planning   Who is requesting discharge planning? Provider   Home or Post Acute Services None   Expected Discharge Disposition Home   Does the patient need discharge transport arranged? No     7/16/24 0948  Plan for colonoscopy today.  H/H stable.  ADOD tomorrow home with no needs.  Suly Connolly RN [facility]

## 2024-07-17 NOTE — DISCHARGE SUMMARY
[facility] Hospitalist Discharge Summary        Eryn AguilarOB: 1945MRN: [id number]    ADMIT DATE: ISCHARGE DATE: 2024    PRIMARYCARE PHYSICIAN: No Assigned PCP Generic Provider, MD    VISIT STATUS: Inpatient    CODE STATUS: Full Code    DISCHARGE DIAGNOSES:    Principal Problem:    Gastrointestinal hemorrhage with hematemesis  Active Problems:    Anemia due to acute blood loss    Cerebrovascular accident (CVA) (Multi)    Coronary artery disease involving native coronary artery of native heart without angina pectoris    Dyslipidemia, goal LDL below 70    Primary hypertension    GI bleed      CONSULTANTS:    GI    HOSPITAL COURSE:    #suspected GI bleed  - GI consulted. EGD with small hiatal hernia but no obvious source of anemia or bleeding. Colonoscopy was not revealing of obvious source of bleeding either (see report). May need capsule endoscopy down the road. She is going back to [place] tomorrow, she will follow up there  - PPI daily, can resume Eliquis. Patient was counseled on return precautions     #ABLA  - hgb stabalized     #PAF  - resume Eliquis, toprol     #HLD  - continue statin     #B12 Def  - continue home supplement    DAY OF DISCHARGE:      Patient Vitals for the past 24 hrs:   BP Temp Temp src Pulse Resp SpO2 Height Weight   24 1532 111/68 36.6 °C (97.8 °F) -- 96 18 96 % -- --   24 1352 122/54 -- -- 70 16 98 % -- --   24 1337 120/55 -- -- 71 15 99 % -- --   24 1322 112/64 36.2 °C (97.2 °F) Temporal 78 18 98 % -- --   24 1154 117/65 36.3 °C (97.3 °F) Temporal 83 18 99 % 1.524 m (5') 58.5 kg (128 lb 15.5 oz)   24 0756 120/72 37.3 °C (99.1 °F) -- 86 17 98 % -- --       Average, Min, and Max forlast 24 hours Vitals:  TEMPERATURE: Temp  Av.6 °C (97.9 °F)  Min: 36.2 °C (97.2 °F)  Max: 37.3 °C (99.1 °F)    RESPIRATIONS RANGE: Resp  Av  Min: 15  Max: 18    PULSE RANGE: Pulse  Av.7  Min: 70  Max: 96    BLOOD PRESSURE RANGE: Systolic (24hrs),  Av , Min:111 , Max:122   ; Diastolic (24hrs), Av, Min:54, Max:72      PULSE OXIMETRYRANGE: SpO2  Av %  Min: 96 %  Max: 99 %    I/O last 3 completed shifts:  In: 240 (4.1 mL/kg) [P.O.:240]  Out: - (0 mL/kg)   Weight: 58.5 kg           Discharge Meds       Your medication list        START taking these medications        Instructions Last Dose Given Next Dose Due   pantoprazole 40 mg EC tablet  Commonly known as: ProtoNix      Take 1 tablet (40 mg) by mouth once daily. Do not crush, chew, or split.              CONTINUE taking these medications        Instructions Last Dose Given Next Dose Due   alendronate 10 mg tablet  Commonly known as: Fosamax           ascorbic acid 1,000 mg tablet  Commonly known as: Vitamin C           calcium carbonate 500 mg calcium (1,250 mg) tablet  Commonly known as: Oscal           cyanocobalamin 1,000 mcg tablet  Commonly known as: Vitamin B-12           Eliquis 5 mg tablet  Generic drug: apixaban           ezetimibe 10 mg tablet  Commonly known as: Zetia           metoprolol succinate XL 25 mg 24 hr tablet  Commonly known as: Toprol-XL           omega-3 acid ethyl esters 1 gram capsule  Commonly known as: Lovaza           VITAMIN D3 ORAL                     Where to Get Your Medications        These medications were sent to [facility]/pharmacy #[id number] [place] [id number]      Phone: [phone]   pantoprazole 40 mg EC tablet           FOLLOW UP TESTING, PENDING RESULTS OR REFERRALS AT FOLLOW UP VISIT:   [X] Yes as outlined above   [ ] No    DISPOSITION: Home    FACILITY NAME: NA    Follow up with PCP No Assigned PCP Generic Provider, MD    Outpatient Follow-Up Appointments  No future appointments.    DISCHARGE TIME: < 30 minutes providing counseling or in coordination of care. Total time on this day of visit includes record and documentation review before and after visit including documentation and time not explicitly included on EMR  time stamp.    Madhuri Meehan MD  Hospital Medicine

## 2024-07-23 LAB
LABORATORY COMMENT REPORT: NORMAL
PATH REPORT.FINAL DX SPEC: NORMAL
PATH REPORT.GROSS SPEC: NORMAL
PATH REPORT.MICROSCOPIC SPEC OTHER STN: NORMAL
PATH REPORT.RELEVANT HX SPEC: NORMAL
PATH REPORT.TOTAL CANCER: NORMAL

## 2024-07-30 NOTE — DOCUMENTATION CLARIFICATION NOTE
PATIENT:               HELDER DEE  ACCT #:                  [id number]  MRN:                       [id number]  :                       1945  ADMIT DATE:       2024 8:55 AM  DISCH DATE:        2024 8:04 PM  RESPONDING PROVIDER #:        [id number]          PROVIDER RESPONSE TEXT:    Acute Blood Loss Anemia ruled out    CDI QUERY TEXT:    Clarification    Instruction:    Based on your assessment of the patient and the clinical information, please provide the requested documentation by clicking on the appropriate radio button and enter any additional information if prompted.    Question: Please further clarify the diagnosis of Acute Blood Loss Anemia as    When answering this query, please exercise your independent professional judgment. The fact that a question is being asked, does not imply that any particular answer is desired or expected.    The patient's clinical indicators include:  Clinical Information:  78 y/o female [place] [facility] [place] with DX GIB - hematemesis, melena noted.      Documented Diagnosis:  HP note per ERASTO Cross DO : ?ABLA?  DC Summary per KEILA Meehan MD : ?Anemia due to acute blood loss?/ ?GI consulted. EGD with small hiatal hernia but no obvious source of anemia or bleeding. Colonoscopy was not revealing of obvious source of bleeding either?      Clinical Indicators and Documentation:  Vital Signs  - : T 36.2 - 37.3, HR 60 - 122, RR 16 - 22, SpO2 94 - 100,  - 138  Per HP  ?Currently no chest pain or shortness of breath. No diaphoresis. Still feels weak.? / ?Tachycardia present.? / ?She is alert and oriented to person, place, and time.?    Labs trend  - :  HGB 9.5 - 10.7 - 9.1 - 8.5 - 9.1 - 8.5  HCT 28.2 - 31.6 - 28.1 - 25.1 - 27.7 - 24.1    07/15 - EGD - Small hiatal hernia found. No specimen collection   - Colonoscopy performed with single polyp removal in sigmoid colon    Blood Products given: None  Iron Supplementation:  None    Monitoring: Daily CBC drawn      Treatment: IV LR, GI consult  Risk Factors: PMH: HTN, HLD, PAF on Eliquis, HX Gastric ulcer d/t NSAIDs  Options provided:  -- Acute Blood Loss Anemia ruled out  -- Acute Blood Loss Anemia ruled in as evidenced by, Please specify additional information below  -- Other - I will add my own diagnosis  -- Refer to Clinical Documentation Reviewer    Query created by: Christine Arzola on 7/23/2024 9:32 AM      Electronically signed by:  HEDY ARCE MD 7/30/2024 11:00 AM

## 2025-05-13 ENCOUNTER — TELEPHONE ENCOUNTER (OUTPATIENT)
Dept: URBAN - METROPOLITAN AREA CLINIC 63 | Facility: CLINIC | Age: 80
End: 2025-05-13